# Patient Record
Sex: FEMALE | Race: ASIAN | NOT HISPANIC OR LATINO
[De-identification: names, ages, dates, MRNs, and addresses within clinical notes are randomized per-mention and may not be internally consistent; named-entity substitution may affect disease eponyms.]

---

## 2020-02-03 ENCOUNTER — APPOINTMENT (OUTPATIENT)
Dept: OBGYN | Facility: CLINIC | Age: 75
End: 2020-02-03

## 2020-05-31 ENCOUNTER — EMERGENCY (EMERGENCY)
Facility: HOSPITAL | Age: 75
LOS: 0 days | Discharge: HOME | End: 2020-05-31
Attending: STUDENT IN AN ORGANIZED HEALTH CARE EDUCATION/TRAINING PROGRAM
Payer: MEDICAID

## 2020-05-31 VITALS
RESPIRATION RATE: 16 BRPM | DIASTOLIC BLOOD PRESSURE: 65 MMHG | TEMPERATURE: 98 F | SYSTOLIC BLOOD PRESSURE: 143 MMHG | OXYGEN SATURATION: 97 % | HEART RATE: 83 BPM

## 2020-05-31 VITALS — WEIGHT: 125 LBS | HEIGHT: 64 IN

## 2020-05-31 DIAGNOSIS — R19.7 DIARRHEA, UNSPECIFIED: ICD-10-CM

## 2020-05-31 DIAGNOSIS — K92.1 MELENA: ICD-10-CM

## 2020-05-31 DIAGNOSIS — K62.5 HEMORRHAGE OF ANUS AND RECTUM: ICD-10-CM

## 2020-05-31 DIAGNOSIS — R94.31 ABNORMAL ELECTROCARDIOGRAM [ECG] [EKG]: ICD-10-CM

## 2020-05-31 LAB
ALBUMIN SERPL ELPH-MCNC: 3.5 G/DL — SIGNIFICANT CHANGE UP (ref 3.3–5)
ALP SERPL-CCNC: 83 U/L — SIGNIFICANT CHANGE UP (ref 40–120)
ALT FLD-CCNC: 25 U/L — SIGNIFICANT CHANGE UP (ref 12–78)
ANION GAP SERPL CALC-SCNC: 4 MMOL/L — LOW (ref 5–17)
APPEARANCE UR: CLEAR — SIGNIFICANT CHANGE UP
APTT BLD: 30.1 SEC — SIGNIFICANT CHANGE UP (ref 27.5–36.3)
AST SERPL-CCNC: 14 U/L — LOW (ref 15–37)
BASOPHILS # BLD AUTO: 0.04 K/UL — SIGNIFICANT CHANGE UP (ref 0–0.2)
BASOPHILS NFR BLD AUTO: 0.5 % — SIGNIFICANT CHANGE UP (ref 0–2)
BILIRUB SERPL-MCNC: 0.7 MG/DL — SIGNIFICANT CHANGE UP (ref 0.2–1.2)
BILIRUB UR-MCNC: NEGATIVE — SIGNIFICANT CHANGE UP
BUN SERPL-MCNC: 13 MG/DL — SIGNIFICANT CHANGE UP (ref 7–23)
CALCIUM SERPL-MCNC: 9.1 MG/DL — SIGNIFICANT CHANGE UP (ref 8.5–10.1)
CHLORIDE SERPL-SCNC: 103 MMOL/L — SIGNIFICANT CHANGE UP (ref 96–108)
CO2 SERPL-SCNC: 28 MMOL/L — SIGNIFICANT CHANGE UP (ref 22–31)
COLOR SPEC: YELLOW — SIGNIFICANT CHANGE UP
CREAT SERPL-MCNC: 1.04 MG/DL — SIGNIFICANT CHANGE UP (ref 0.5–1.3)
DIFF PNL FLD: ABNORMAL
EOSINOPHIL # BLD AUTO: 0.08 K/UL — SIGNIFICANT CHANGE UP (ref 0–0.5)
EOSINOPHIL NFR BLD AUTO: 0.9 % — SIGNIFICANT CHANGE UP (ref 0–6)
GLUCOSE SERPL-MCNC: 172 MG/DL — HIGH (ref 70–99)
GLUCOSE UR QL: NEGATIVE MG/DL — SIGNIFICANT CHANGE UP
HCT VFR BLD CALC: 42.6 % — SIGNIFICANT CHANGE UP (ref 34.5–45)
HGB BLD-MCNC: 14.2 G/DL — SIGNIFICANT CHANGE UP (ref 11.5–15.5)
IMM GRANULOCYTES NFR BLD AUTO: 0.1 % — SIGNIFICANT CHANGE UP (ref 0–1.5)
INR BLD: 1.04 RATIO — SIGNIFICANT CHANGE UP (ref 0.88–1.16)
KETONES UR-MCNC: NEGATIVE — SIGNIFICANT CHANGE UP
LEUKOCYTE ESTERASE UR-ACNC: ABNORMAL
LYMPHOCYTES # BLD AUTO: 1.76 K/UL — SIGNIFICANT CHANGE UP (ref 1–3.3)
LYMPHOCYTES # BLD AUTO: 20 % — SIGNIFICANT CHANGE UP (ref 13–44)
MCHC RBC-ENTMCNC: 31.3 PG — SIGNIFICANT CHANGE UP (ref 27–34)
MCHC RBC-ENTMCNC: 33.3 GM/DL — SIGNIFICANT CHANGE UP (ref 32–36)
MCV RBC AUTO: 93.8 FL — SIGNIFICANT CHANGE UP (ref 80–100)
MONOCYTES # BLD AUTO: 0.67 K/UL — SIGNIFICANT CHANGE UP (ref 0–0.9)
MONOCYTES NFR BLD AUTO: 7.6 % — SIGNIFICANT CHANGE UP (ref 2–14)
NEUTROPHILS # BLD AUTO: 6.23 K/UL — SIGNIFICANT CHANGE UP (ref 1.8–7.4)
NEUTROPHILS NFR BLD AUTO: 70.9 % — SIGNIFICANT CHANGE UP (ref 43–77)
NITRITE UR-MCNC: NEGATIVE — SIGNIFICANT CHANGE UP
PH UR: 7 — SIGNIFICANT CHANGE UP (ref 5–8)
PLATELET # BLD AUTO: 179 K/UL — SIGNIFICANT CHANGE UP (ref 150–400)
POTASSIUM SERPL-MCNC: 3.9 MMOL/L — SIGNIFICANT CHANGE UP (ref 3.5–5.3)
POTASSIUM SERPL-SCNC: 3.9 MMOL/L — SIGNIFICANT CHANGE UP (ref 3.5–5.3)
PROT SERPL-MCNC: 7.4 GM/DL — SIGNIFICANT CHANGE UP (ref 6–8.3)
PROT UR-MCNC: NEGATIVE MG/DL — SIGNIFICANT CHANGE UP
PROTHROM AB SERPL-ACNC: 11.6 SEC — SIGNIFICANT CHANGE UP (ref 10–12.9)
RBC # BLD: 4.54 M/UL — SIGNIFICANT CHANGE UP (ref 3.8–5.2)
RBC # FLD: 13 % — SIGNIFICANT CHANGE UP (ref 10.3–14.5)
SODIUM SERPL-SCNC: 135 MMOL/L — SIGNIFICANT CHANGE UP (ref 135–145)
SP GR SPEC: 1.01 — SIGNIFICANT CHANGE UP (ref 1.01–1.02)
TROPONIN I SERPL-MCNC: <0.015 NG/ML — SIGNIFICANT CHANGE UP (ref 0.01–0.04)
UROBILINOGEN FLD QL: NEGATIVE MG/DL — SIGNIFICANT CHANGE UP
WBC # BLD: 8.79 K/UL — SIGNIFICANT CHANGE UP (ref 3.8–10.5)
WBC # FLD AUTO: 8.79 K/UL — SIGNIFICANT CHANGE UP (ref 3.8–10.5)

## 2020-05-31 PROCEDURE — 86900 BLOOD TYPING SEROLOGIC ABO: CPT

## 2020-05-31 PROCEDURE — 74177 CT ABD & PELVIS W/CONTRAST: CPT

## 2020-05-31 PROCEDURE — 71045 X-RAY EXAM CHEST 1 VIEW: CPT | Mod: 26

## 2020-05-31 PROCEDURE — 71045 X-RAY EXAM CHEST 1 VIEW: CPT

## 2020-05-31 PROCEDURE — 93010 ELECTROCARDIOGRAM REPORT: CPT | Mod: 76

## 2020-05-31 PROCEDURE — 74177 CT ABD & PELVIS W/CONTRAST: CPT | Mod: 26

## 2020-05-31 PROCEDURE — 93005 ELECTROCARDIOGRAM TRACING: CPT

## 2020-05-31 PROCEDURE — 99285 EMERGENCY DEPT VISIT HI MDM: CPT

## 2020-05-31 PROCEDURE — 81001 URINALYSIS AUTO W/SCOPE: CPT

## 2020-05-31 PROCEDURE — 80053 COMPREHEN METABOLIC PANEL: CPT

## 2020-05-31 PROCEDURE — 85025 COMPLETE CBC W/AUTO DIFF WBC: CPT

## 2020-05-31 PROCEDURE — 86901 BLOOD TYPING SEROLOGIC RH(D): CPT

## 2020-05-31 PROCEDURE — 83690 ASSAY OF LIPASE: CPT

## 2020-05-31 PROCEDURE — 86850 RBC ANTIBODY SCREEN: CPT

## 2020-05-31 PROCEDURE — 85730 THROMBOPLASTIN TIME PARTIAL: CPT

## 2020-05-31 PROCEDURE — 84484 ASSAY OF TROPONIN QUANT: CPT

## 2020-05-31 PROCEDURE — 85610 PROTHROMBIN TIME: CPT

## 2020-05-31 PROCEDURE — 36415 COLL VENOUS BLD VENIPUNCTURE: CPT

## 2020-05-31 PROCEDURE — 99284 EMERGENCY DEPT VISIT MOD MDM: CPT | Mod: 25

## 2020-05-31 RX ORDER — SODIUM CHLORIDE 9 MG/ML
1000 INJECTION INTRAMUSCULAR; INTRAVENOUS; SUBCUTANEOUS ONCE
Refills: 0 | Status: COMPLETED | OUTPATIENT
Start: 2020-05-31 | End: 2020-05-31

## 2020-05-31 RX ADMIN — SODIUM CHLORIDE 1000 MILLILITER(S): 9 INJECTION INTRAMUSCULAR; INTRAVENOUS; SUBCUTANEOUS at 13:15

## 2020-05-31 NOTE — ED PROVIDER NOTE - PATIENT PORTAL LINK FT
You can access the FollowMyHealth Patient Portal offered by [facility] by registering at the following website: [email or web address]. By joining [facility]’s FollowMyHealth portal, you will also be able to view your health information using other applications (apps) compatible with our system.

## 2020-05-31 NOTE — ED ADULT TRIAGE NOTE - CHIEF COMPLAINT QUOTE
Patient presents with daughter who reports patient had abdominal pain since Friday. Reports patient had multiple episodes of bloody bowel movements yesterday and one this morning. Patient denies blood thinners, denies lightheaded dizziness, or shortness of breath. Patient reports nausea and that her heart feels like it is racing. Patient only speaks Mandarin, patient is also legally blind. Daughter phone # [phone]

## 2020-05-31 NOTE — ED ADULT NURSE NOTE - CHIEF COMPLAINT QUOTE
Patient presents with daughter who reports patient had abdominal pain since Friday. Reports patient had multiple episodes of bloody bowel movements yesterday and one this morning. Patient denies blood thinners, denies lightheaded dizziness, or shortness of breath. Patient reports nausea and that her heart feels like it is racing. Patient only speaks Mandarin, patient is also legally blind. Daughter phone # [phone]

## 2020-05-31 NOTE — ED PROVIDER NOTE - PROGRESS NOTE DETAILS
Joann Savage: Stool guaiac performed by  (insert doctors). Lot #: [id number], Expiration 08/31/2020; Result: (negative). Janette DO: Patient with abnormal EKG; no chest pain on multiple re-evals; 2 troponins and to f/u with cards/pmd as outpatient; gi f/u in 2 days as originally instructed; s/o to Dr. Alonso pending second troponin at this time. 2nd troponin.  Pt appears well, no acute complaints.  D/c home, f/u as scheduled. Dwain Alonso D.O. Joann Savage: Stool guaiac performed by Dr. Savage; Lot #: [id number], Expiration 08/31/2020; Result: negative.

## 2020-05-31 NOTE — ED ADULT NURSE NOTE - OBJECTIVE STATEMENT
pt a/ox3, BIB daughter through triage, c/o "diarrhea x several days, worsening. and bloody stools". pt daughter reports "she normally is constipated but for the past few days she has had diarrhea, yesterday she had several episodes of what looked like mucus and I noted blood in her stool also, she started Levaquin yesterday that she had at  home". pt denies fever, chills, sick contacts, sob, dizziness. pt reports "my chest just doesn't feel right".

## 2020-05-31 NOTE — ED ADULT NURSE NOTE - NSIMPLEMENTINTERV_GEN_ALL_ED
Implemented All Fall Risk Interventions:  [place] to call system. Call bell, personal items and telephone within reach. Instruct patient to call for assistance. Room bathroom lighting operational. Non-slip footwear when patient is off stretcher. Physically safe environment: no spills, clutter or unnecessary equipment. Stretcher in lowest position, wheels locked, appropriate side rails in place. Provide visual cue, wrist band, yellow gown, etc. Monitor gait and stability. Monitor for mental status changes and reorient to person, place, and time. Review medications for side effects contributing to fall risk. Reinforce activity limits and safety measures with patient and family.

## 2020-05-31 NOTE — ED STATDOCS - PROGRESS NOTE DETAILS
Joann FUNES for ED attending, Dr. Mota: 75 y/o F with no pertinent PMHx presenting to the ED c/o blood in stool x1 day. Per daughter, patient has had multiple BM with dark red blood mixed with stool. States that she has also been having CP that she describes as pressure. Denies any SOB, cough, fever or chills. Patient will be sent to main for further evaluation.

## 2020-05-31 NOTE — ED PROVIDER NOTE - OBJECTIVE STATEMENT
75 y/o female with a hx of constipation, presents to the ED BIB daughter for evaluation of bloody stools. Pt had multiple episodes of diarrhea the last few days. Last night had bright red blood per rectum. Had another episode of bright red blood per rectum this morning, so came to ED. Has an appointment with her GI Dr. Sepulveda on Wednesday, but pt did not want to wait. Denies chest pain, sob, lightheadedness, dizziness, abdominal pain.

## 2020-06-01 NOTE — ED POST DISCHARGE NOTE - DETAILS
Patient contacted, reviewed labs. Expressed concern over continued bleeding. Advised if concern is high to return to ED for reevalaution. Otherwise continue with GI follow up tomorrow as planned. - SHIRA Carlson

## 2020-11-23 PROBLEM — K59.00 CONSTIPATION, UNSPECIFIED: Chronic | Status: ACTIVE | Noted: 2020-05-31

## 2020-11-25 PROBLEM — Z00.00 ENCOUNTER FOR PREVENTIVE HEALTH EXAMINATION: Status: ACTIVE | Noted: 2020-11-25

## 2020-12-02 ENCOUNTER — APPOINTMENT (OUTPATIENT)
Dept: CARDIOLOGY | Facility: CLINIC | Age: 75
End: 2020-12-02
Payer: MEDICAID

## 2020-12-02 ENCOUNTER — NON-APPOINTMENT (OUTPATIENT)
Age: 75
End: 2020-12-02

## 2020-12-02 VITALS
DIASTOLIC BLOOD PRESSURE: 68 MMHG | WEIGHT: 125 LBS | RESPIRATION RATE: 16 BRPM | HEIGHT: 64 IN | HEART RATE: 63 BPM | TEMPERATURE: 97.3 F | BODY MASS INDEX: 21.34 KG/M2 | SYSTOLIC BLOOD PRESSURE: 124 MMHG

## 2020-12-02 DIAGNOSIS — M79.673 PAIN IN UNSPECIFIED FOOT: ICD-10-CM

## 2020-12-02 DIAGNOSIS — R09.89 OTHER SPECIFIED SYMPTOMS AND SIGNS INVOLVING THE CIRCULATORY AND RESPIRATORY SYSTEMS: ICD-10-CM

## 2020-12-02 DIAGNOSIS — Z78.9 OTHER SPECIFIED HEALTH STATUS: ICD-10-CM

## 2020-12-02 DIAGNOSIS — Z87.898 PERSONAL HISTORY OF OTHER SPECIFIED CONDITIONS: ICD-10-CM

## 2020-12-02 DIAGNOSIS — Z87.19 PERSONAL HISTORY OF OTHER DISEASES OF THE DIGESTIVE SYSTEM: ICD-10-CM

## 2020-12-02 DIAGNOSIS — Z86.39 PERSONAL HISTORY OF OTHER ENDOCRINE, NUTRITIONAL AND METABOLIC DISEASE: ICD-10-CM

## 2020-12-02 PROCEDURE — 93000 ELECTROCARDIOGRAM COMPLETE: CPT

## 2020-12-02 PROCEDURE — 99204 OFFICE O/P NEW MOD 45 MIN: CPT

## 2020-12-02 PROCEDURE — 99072 ADDL SUPL MATRL&STAF TM PHE: CPT

## 2020-12-02 RX ORDER — MULTIVITAMIN
TABLET ORAL
Refills: 0 | Status: ACTIVE | COMMUNITY

## 2020-12-02 RX ORDER — PSYLLIUM HUSK 0.4 G
CAPSULE ORAL
Refills: 0 | Status: ACTIVE | COMMUNITY

## 2020-12-02 RX ORDER — TIMOLOL MALEATE 5 MG/ML
SOLUTION/ DROPS OPHTHALMIC
Refills: 0 | Status: DISCONTINUED | COMMUNITY
End: 2020-12-02

## 2020-12-02 RX ORDER — UBIDECARENONE/VIT E ACET 100MG-5
CAPSULE ORAL
Refills: 0 | Status: ACTIVE | COMMUNITY

## 2020-12-05 ENCOUNTER — APPOINTMENT (OUTPATIENT)
Dept: CARDIOLOGY | Facility: CLINIC | Age: 75
End: 2020-12-05
Payer: MEDICAID

## 2020-12-05 PROCEDURE — 99072 ADDL SUPL MATRL&STAF TM PHE: CPT

## 2020-12-05 PROCEDURE — 93306 TTE W/DOPPLER COMPLETE: CPT

## 2020-12-05 PROCEDURE — 93880 EXTRACRANIAL BILAT STUDY: CPT

## 2021-01-15 ENCOUNTER — APPOINTMENT (OUTPATIENT)
Dept: CARDIOLOGY | Facility: CLINIC | Age: 76
End: 2021-01-15
Payer: MEDICAID

## 2021-01-15 PROCEDURE — 99072 ADDL SUPL MATRL&STAF TM PHE: CPT

## 2021-01-15 PROCEDURE — A9500: CPT

## 2021-01-15 PROCEDURE — 78452 HT MUSCLE IMAGE SPECT MULT: CPT

## 2021-01-15 PROCEDURE — 93015 CV STRESS TEST SUPVJ I&R: CPT

## 2021-01-15 RX ADMIN — REGADENOSON 0 MG/5ML: 0.08 INJECTION, SOLUTION INTRAVENOUS at 00:00

## 2021-01-15 RX ADMIN — AMINOPHYLLINE 0 MG/ML: 25 INJECTION, SOLUTION INTRAVENOUS at 00:00

## 2021-01-19 ENCOUNTER — APPOINTMENT (OUTPATIENT)
Dept: CARDIOLOGY | Facility: CLINIC | Age: 76
End: 2021-01-19
Payer: MEDICAID

## 2021-01-19 VITALS
RESPIRATION RATE: 16 BRPM | HEART RATE: 71 BPM | SYSTOLIC BLOOD PRESSURE: 120 MMHG | DIASTOLIC BLOOD PRESSURE: 68 MMHG | BODY MASS INDEX: 22.02 KG/M2 | HEIGHT: 64 IN | TEMPERATURE: 97.4 F | WEIGHT: 129 LBS

## 2021-01-19 DIAGNOSIS — R94.39 ABNORMAL RESULT OF OTHER CARDIOVASCULAR FUNCTION STUDY: ICD-10-CM

## 2021-01-19 DIAGNOSIS — I49.3 VENTRICULAR PREMATURE DEPOLARIZATION: ICD-10-CM

## 2021-01-19 DIAGNOSIS — R06.02 SHORTNESS OF BREATH: ICD-10-CM

## 2021-01-19 PROCEDURE — 99215 OFFICE O/P EST HI 40 MIN: CPT

## 2021-01-19 PROCEDURE — 99072 ADDL SUPL MATRL&STAF TM PHE: CPT

## 2021-01-19 PROCEDURE — 93000 ELECTROCARDIOGRAM COMPLETE: CPT

## 2021-01-19 NOTE — HISTORY OF PRESENT ILLNESS
[FreeTextEntry1] : Once again, she was scheduled to have a colonoscopy to evaluate her severe constipation last year but this was aborted due to findings of having an irregular EKG and also that she had some type of anxiety attack during preadmission.  \par \par Pharmacologic Nuclear Stress Test (1/15/2021):  Patient developed shortness of breath and fatigue during the stress test, resolved with IV aminophylline.  Patient developed occasional PAC's during exercise.  There was 1.0 mm downsloping ST segment depression in leads, II, III, and aVF and V4, V5, and V6 during the peak stress period (EKG positive for ischemia).  More importantly, there was abnormal myocardial perfusion imaging demonstrating small area of mild to moderate apical/apical anterior wall ischemia.  The LV systolic function was preserved with an EF of 68%.\par \par Transthoracic Echocardiogram (12/5/2020):  Normal global LV systolic function with an LVEF of 60 to 65%.  The left and right atria normal in size and structure.  Trace MR.  Mild AR. Trace CA.\par \par Carotid Doppler (12/5/2020):  There was minimal homogenous plaque on the left and NO evidence of plaque on the right.  There was antegrade flow of the vertebral arteries bilaterally.\par \par One Week Event Monitor (12/4 to 12/10/2020):  Normal sinus rhythm with average heart rate 67 bpm (Max 120, Min 50).  There were no findings of atrial fibrillation, SVT, VT, pauses of heart block.  There were some PAC's found with 5% burden and PVC's with less than 1% burden.\par \par

## 2021-01-19 NOTE — REASON FOR VISIT
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 Valle is here today for follow up and to review the results of most recent Nuclear Stress Test, Transthoracic Echocardiogram, Carotid Doppler and One Week Event Monitor.\par \par The patient is here today with her daughter whom is translating.  She continues to experience intermittent episodes of shortness of breath and palpitations, but reports the chest tightness and discomfort has improved although still present from time to time.  She has been tolerating recently prescribed Crestor 10 mg nightly.\par \par She denies associated symptoms such as diaphoresis, PND, orthopnea, presyncope, edema.\par \par

## 2021-01-19 NOTE — ASSESSMENT
[FreeTextEntry1] : EKG 1/19/2021:  The EKG illustrates sinus rhythm with PAC's in bigeminy, rate of 71 bpm, ST-T wave abnormality consider inferior ischemia, ST-T wave abnormality consider anterolateral ischemia.\par \par Blood work (September 2020):  Creatinine (1.08), BUN (24), ALT (10), AST (17), Total Cholesterol (292), HDL (55), LDL (206), Triglycerides (153), HgA1C (6.6%).

## 2021-01-19 NOTE — DISCUSSION/SUMMARY
[FreeTextEntry1] : 1).  Patient's Nuclear Stress test is positive for coronary ischemia and she remains symptomatic with chest discomfort, shortness of breath and palpitations.  EKG also remains abnormal with ST-T wave changes.\par \par Strongly encouraged an ischemic workup with Left Heart Catheterization performed in the near future to assess coronary perfusion; ?possible revascularization.\par \par Patient would prefer to defer at this time, will discuss with her family and her  about proceeding with angiogram and get back to us in a couple days.\par \par 2).  She is to continue current cardiac meds including Crestor 10 mg nightly.  She is to begin low dose ASA 81 mg daily as well.\par \par 3).  Complete updated blood work one week prior to follow up office visit to assess fasting lipid panel and CMP.\par \par 4).  Immediately go to the emergency department and/or report any untoward symptoms to our office including worsening chest discomfort, shortness of breath, palpitations, excessive sweating.\par \par 5).  Follow up with our office in 6 to 8 weeks or PRN.

## 2021-01-19 NOTE — PHYSICAL EXAM
[General Appearance - Well Developed] : well developed [Normal Appearance] : normal appearance [General Appearance - Well Nourished] : well nourished [General Appearance - In No Acute Distress] : no acute distress [Normal Conjunctiva] : the conjunctiva exhibited no abnormalities [Normal Oral Mucosa] : normal oral mucosa [Normal Jugular Venous A Waves Present] : normal jugular venous A waves present [Normal Jugular Venous V Waves Present] : normal jugular venous V waves present [No Jugular Venous Dumont A Waves] : no jugular venous dumont A waves [] : no respiratory distress [Respiration, Rhythm And Depth] : normal respiratory rhythm and effort [Auscultation Breath Sounds / Voice Sounds] : lungs were clear to auscultation bilaterally [Heart Sounds] : normal S1 and S2 [Murmurs] : no murmurs present [Edema] : no peripheral edema present [FreeTextEntry1] : Normal rate with slightly irregular rhythm  [Bowel Sounds] : normal bowel sounds [Abnormal Walk] : normal gait [Nail Clubbing] : no clubbing of the fingernails [Cyanosis, Localized] : no localized cyanosis [Skin Color & Pigmentation] : normal skin color and pigmentation [Skin Turgor] : normal skin turgor [Oriented To Time, Place, And Person] : oriented to person, place, and time [Impaired Insight] : insight and judgment were intact [Affect] : the affect was normal

## 2021-02-09 RX ORDER — REGADENOSON 0.08 MG/ML
0.4 INJECTION, SOLUTION INTRAVENOUS
Qty: 4 | Refills: 0 | Status: COMPLETED | OUTPATIENT
Start: 2021-01-15

## 2021-02-09 RX ORDER — AMINOPHYLLINE 25 MG/ML
25 INJECTION, SOLUTION INTRAVENOUS
Qty: 0 | Refills: 0 | Status: COMPLETED | OUTPATIENT
Start: 2021-01-15

## 2021-02-19 ENCOUNTER — APPOINTMENT (OUTPATIENT)
Dept: DISASTER EMERGENCY | Facility: CLINIC | Age: 76
End: 2021-02-19

## 2021-02-19 DIAGNOSIS — Z01.818 ENCOUNTER FOR OTHER PREPROCEDURAL EXAMINATION: ICD-10-CM

## 2021-02-19 NOTE — H&P PST ADULT - NSICDXPASTMEDICALHX_GEN_ALL_CORE_FT
PAST MEDICAL HISTORY:  Constipation      PAST MEDICAL HISTORY:  Constipation     Diabetes mellitus     H/O blindness left eye    History of glaucoma

## 2021-02-19 NOTE — H&P PST ADULT - NSICDXPASTSURGICALHX_GEN_ALL_CORE_FT
PAST SURGICAL HISTORY:  H/O eye surgery     Prolapsed uterus s/p surgery to repair    Status post hip replacement, right

## 2021-02-19 NOTE — H&P PST ADULT - HISTORY OF PRESENT ILLNESS
Narrative: This is a 75 F with a significant PMH of consitpation, DM2 and HLD who presents today for anticipated LHC with Dr. Olson.  She was supposed to have a colonoscopy performed for evaluations of severe constipation, but was found to have an irregular EKG.  Further investigation with NST found small area of mild to moderate apical/apical anterior wall ischemia with a LVEF of 68%.  She endorsesintermittent episodes of SOB and palpitations but states that the chest tightness and discomfort has improved, although still present from time to time.  TTE on 12/5/20 with LVEF 60 to 65%, LA and RA normal, trace MR and VT and mild AR.      ASA  Mallampati  BRA  GFR  scr  Symptoms:        Angina (Class):        Ischemic Symptoms:     Heart Failure:        Systolic/Diastolic/Combined:        NYHA Class (within 2 weeks):     Assessment of LVEF:       EF: 68       Assessed by: Stress        Date: 1/15/21    Prior Cardiac Interventions:       PCI's: None       CABG: None    Noninvasive Testing:   Stress Test: Date: 1/15/21       Protocol: Pharm       Duration of Exercise:        Symptoms: SOB and fatigue       EKG Changes: occasional PACs during exercise.  ST depression II, III aVF, V4, V5, V6       DTS:        Myocardial Imaging: Small area of mild-moderate apical/apical anterior wall ischemia       Risk Assessment:       Antianginal Therapies:        Beta Blockers:         Calcium Channel Blockers:        Long Acting Nitrates:        Ranexa:     Associated Risk Factors:        Cerebrovascular Disease: N/A       Chronic Lung Disease: N/A       Peripheral Arterial Disease: N/A       Chronic Kidney Disease (if yes, what is GFR): N/A       Uncontrolled Diabetes (if yes, what is HgbA1C or FBS): N/A       Poorly Controlled Hypertension (if yes, what is SBP): N/A       Morbid Obesity (if yes, what is BMI): N/A       History of Recent Ventricular Arrhythmia: N/A       Inability to Ambulate Safely: N/A       Need for Therapeutic Anticoagulation: N/A       Antiplatelet or Contrast Allergy: N/A Narrative: This is a 75 F with a significant PMH of consitpation, DM2 and HLD who presents today for anticipated LHC with Dr. Olson.  She was supposed to have a colonoscopy performed for evaluations of severe constipation, but was found to have an irregular EKG.  Further investigation with NST found small area of mild to moderate apical/apical anterior wall ischemia with a LVEF of 68%.  She endorses intermittent episodes of SOB and palpitations but states that the chest tightness and discomfort has improved, although still present from time to time.  TTE on 12/5/20 with LVEF 60 to 65%, LA and RA normal, trace MR and WA and mild AR.      ASA 2  Mallampati 2  BRA  GFR    Symptoms:        Angina (Class):        Ischemic Symptoms:     Heart Failure: NONE        Assessment of LVEF:       EF: 68       Assessed by: Stress        Date: 1/15/21    Prior Cardiac Interventions:       PCI's: None       CABG: None    Noninvasive Testing:   Stress Test: Date: 1/15/21       Protocol: Pharm       Duration of Exercise:        Symptoms: SOB and fatigue       EKG Changes: occasional PACs during exercise.  ST depression II, III aVF, V4, V5, V6       DTS:        Myocardial Imaging: Small area of mild-moderate apical/apical anterior wall ischemia       Risk Assessment:       Antianginal Therapies: NONE        Narrative: This is a 75 F with a significant PMH of consitpation, DM2 and HLD who presents today for anticipated LHC with Dr. Olson.  She was supposed to have a colonoscopy performed for evaluations of severe constipation, but was found to have an irregular EKG.  Further investigation with NST found small area of mild to moderate apical/apical anterior wall ischemia with a LVEF of 68%.  She endorses intermittent episodes of SOB and palpitations but states that the chest tightness and discomfort has improved, although still present from time to time.  TTE on 12/5/20 with LVEF 60 to 65%, LA and RA normal, trace MR and SD and mild AR.      ASA 2  Mallampati 2  BRA  GFR 72    Symptoms:        Angina (Class): 2-3       Ischemic Symptoms: chest tightness    Heart Failure: NONE        Assessment of LVEF:       EF: 68       Assessed by: Stress        Date: 1/15/21    Prior Cardiac Interventions:       PCI's: None       CABG: None    Noninvasive Testing:   Stress Test: Date: 1/15/21       Protocol: Pharm       Duration of Exercise:        Symptoms: SOB and fatigue       EKG Changes: occasional PACs during exercise.  ST depression II, III aVF, V4, V5, V6       DTS:        Myocardial Imaging: Small area of mild-moderate apical/apical anterior wall ischemia       Risk Assessment: Low      Antianginal Therapies: NONE        Narrative: This is a 75 F with a significant PMH of consitpation, DM2 and HLD who presents today for anticipated LHC with Dr. Olson.  She was supposed to have a colonoscopy performed for evaluations of severe constipation, but was found to have an irregular EKG.  Further investigation with NST found small area of mild to moderate apical/apical anterior wall ischemia with a LVEF of 68%.  She endorses intermittent episodes of SOB and palpitations but states that the chest tightness and discomfort has improved, although still present from time to time.  TTE on 12/5/20 with LVEF 60 to 65%, LA and RA normal, trace MR and IA and mild AR.      ASA 2  Mallampati 2  BRA 2.6%  GFR 72    Symptoms:        Angina (Class): 2-3       Ischemic Symptoms: chest tightness    Heart Failure: NONE        Assessment of LVEF:       EF: 68       Assessed by: Stress        Date: 1/15/21    Prior Cardiac Interventions:       PCI's: None       CABG: None    Noninvasive Testing:   Stress Test: Date: 1/15/21       Protocol: Pharm       Duration of Exercise:        Symptoms: SOB and fatigue       EKG Changes: occasional PACs during exercise.  ST depression II, III aVF, V4, V5, V6       DTS:        Myocardial Imaging: Small area of mild-moderate apical/apical anterior wall ischemia       Risk Assessment: Low      Antianginal Therapies: NONE

## 2021-02-19 NOTE — H&P PST ADULT - ASSESSMENT
75 F with a significant PMH of consitpation, DM2 and HLD who presents today for anticipated LHC with Dr. Olson.      - Consent to be obtained by physician  - 12 Lead EKG, Labs and imaging to be reviewed  - Procedure explained at length.  Risks v benefits reviewed.  All questions answered.

## 2021-02-20 LAB — SARS-COV-2 N GENE NPH QL NAA+PROBE: NOT DETECTED

## 2021-02-22 ENCOUNTER — TRANSCRIPTION ENCOUNTER (OUTPATIENT)
Age: 76
End: 2021-02-22

## 2021-02-22 ENCOUNTER — OUTPATIENT (OUTPATIENT)
Dept: OUTPATIENT SERVICES | Facility: HOSPITAL | Age: 76
LOS: 1 days | Discharge: HOME | End: 2021-02-22
Payer: MEDICAID

## 2021-02-22 VITALS
OXYGEN SATURATION: 98 % | WEIGHT: 130.07 LBS | TEMPERATURE: 98 F | RESPIRATION RATE: 18 BRPM | DIASTOLIC BLOOD PRESSURE: 77 MMHG | HEIGHT: 62 IN | SYSTOLIC BLOOD PRESSURE: 152 MMHG | HEART RATE: 65 BPM

## 2021-02-22 VITALS
OXYGEN SATURATION: 97 % | SYSTOLIC BLOOD PRESSURE: 142 MMHG | HEART RATE: 64 BPM | DIASTOLIC BLOOD PRESSURE: 70 MMHG | RESPIRATION RATE: 16 BRPM

## 2021-02-22 DIAGNOSIS — R07.9 CHEST PAIN, UNSPECIFIED: ICD-10-CM

## 2021-02-22 DIAGNOSIS — N81.4 UTEROVAGINAL PROLAPSE, UNSPECIFIED: Chronic | ICD-10-CM

## 2021-02-22 DIAGNOSIS — Z98.890 OTHER SPECIFIED POSTPROCEDURAL STATES: Chronic | ICD-10-CM

## 2021-02-22 DIAGNOSIS — Z96.641 PRESENCE OF RIGHT ARTIFICIAL HIP JOINT: Chronic | ICD-10-CM

## 2021-02-22 LAB
ANION GAP SERPL CALC-SCNC: 10 MMOL/L — SIGNIFICANT CHANGE UP (ref 5–17)
APTT BLD: 30.1 SEC — SIGNIFICANT CHANGE UP (ref 27.5–35.5)
BUN SERPL-MCNC: 16 MG/DL — SIGNIFICANT CHANGE UP (ref 8–20)
CALCIUM SERPL-MCNC: 9.5 MG/DL — SIGNIFICANT CHANGE UP (ref 8.6–10.2)
CHLORIDE SERPL-SCNC: 104 MMOL/L — SIGNIFICANT CHANGE UP (ref 98–107)
CO2 SERPL-SCNC: 27 MMOL/L — SIGNIFICANT CHANGE UP (ref 22–29)
CREAT SERPL-MCNC: 0.81 MG/DL — SIGNIFICANT CHANGE UP (ref 0.5–1.3)
GLUCOSE SERPL-MCNC: 145 MG/DL — HIGH (ref 70–99)
HCT VFR BLD CALC: 44.9 % — SIGNIFICANT CHANGE UP (ref 34.5–45)
HGB BLD-MCNC: 15 G/DL — SIGNIFICANT CHANGE UP (ref 11.5–15.5)
INR BLD: 1 RATIO — SIGNIFICANT CHANGE UP (ref 0.88–1.16)
MAGNESIUM SERPL-MCNC: 2.3 MG/DL — SIGNIFICANT CHANGE UP (ref 1.6–2.6)
MCHC RBC-ENTMCNC: 31.3 PG — SIGNIFICANT CHANGE UP (ref 27–34)
MCHC RBC-ENTMCNC: 33.4 GM/DL — SIGNIFICANT CHANGE UP (ref 32–36)
MCV RBC AUTO: 93.5 FL — SIGNIFICANT CHANGE UP (ref 80–100)
PHOSPHATE SERPL-MCNC: 3.7 MG/DL — SIGNIFICANT CHANGE UP (ref 2.4–4.7)
PLATELET # BLD AUTO: 209 K/UL — SIGNIFICANT CHANGE UP (ref 150–400)
POTASSIUM SERPL-MCNC: 4.4 MMOL/L — SIGNIFICANT CHANGE UP (ref 3.5–5.3)
POTASSIUM SERPL-SCNC: 4.4 MMOL/L — SIGNIFICANT CHANGE UP (ref 3.5–5.3)
PROTHROM AB SERPL-ACNC: 11.6 SEC — SIGNIFICANT CHANGE UP (ref 10.6–13.6)
RBC # BLD: 4.8 M/UL — SIGNIFICANT CHANGE UP (ref 3.8–5.2)
RBC # FLD: 13.4 % — SIGNIFICANT CHANGE UP (ref 10.3–14.5)
SODIUM SERPL-SCNC: 141 MMOL/L — SIGNIFICANT CHANGE UP (ref 135–145)
WBC # BLD: 6.37 K/UL — SIGNIFICANT CHANGE UP (ref 3.8–10.5)
WBC # FLD AUTO: 6.37 K/UL — SIGNIFICANT CHANGE UP (ref 3.8–10.5)

## 2021-02-22 PROCEDURE — 92978 ENDOLUMINL IVUS OCT C 1ST: CPT | Mod: 26,LC

## 2021-02-22 PROCEDURE — 99153 MOD SED SAME PHYS/QHP EA: CPT

## 2021-02-22 PROCEDURE — 85027 COMPLETE CBC AUTOMATED: CPT

## 2021-02-22 PROCEDURE — 93010 ELECTROCARDIOGRAM REPORT: CPT

## 2021-02-22 PROCEDURE — 80048 BASIC METABOLIC PNL TOTAL CA: CPT

## 2021-02-22 PROCEDURE — 92928 PRQ TCAT PLMT NTRAC ST 1 LES: CPT | Mod: LC

## 2021-02-22 PROCEDURE — 99152 MOD SED SAME PHYS/QHP 5/>YRS: CPT

## 2021-02-22 PROCEDURE — 92978 ENDOLUMINL IVUS OCT C 1ST: CPT | Mod: LC

## 2021-02-22 PROCEDURE — C1725: CPT

## 2021-02-22 PROCEDURE — C9600: CPT | Mod: LC

## 2021-02-22 PROCEDURE — 83735 ASSAY OF MAGNESIUM: CPT

## 2021-02-22 PROCEDURE — 92921: CPT | Mod: 59,LC

## 2021-02-22 PROCEDURE — C1753: CPT

## 2021-02-22 PROCEDURE — 85610 PROTHROMBIN TIME: CPT

## 2021-02-22 PROCEDURE — 36415 COLL VENOUS BLD VENIPUNCTURE: CPT

## 2021-02-22 PROCEDURE — 92921: CPT | Mod: LC

## 2021-02-22 PROCEDURE — C1894: CPT

## 2021-02-22 PROCEDURE — 85730 THROMBOPLASTIN TIME PARTIAL: CPT

## 2021-02-22 PROCEDURE — 93454 CORONARY ARTERY ANGIO S&I: CPT | Mod: XU

## 2021-02-22 PROCEDURE — 93005 ELECTROCARDIOGRAM TRACING: CPT

## 2021-02-22 PROCEDURE — 93454 CORONARY ARTERY ANGIO S&I: CPT | Mod: 26,XU

## 2021-02-22 PROCEDURE — C1887: CPT

## 2021-02-22 PROCEDURE — C1769: CPT

## 2021-02-22 PROCEDURE — 84100 ASSAY OF PHOSPHORUS: CPT

## 2021-02-22 PROCEDURE — C1874: CPT

## 2021-02-22 RX ORDER — TIMOLOL 0.5 %
1 DROPS OPHTHALMIC (EYE)
Qty: 0 | Refills: 0 | DISCHARGE

## 2021-02-22 RX ORDER — ASPIRIN/CALCIUM CARB/MAGNESIUM 324 MG
81 TABLET ORAL DAILY
Refills: 0 | Status: DISCONTINUED | OUTPATIENT
Start: 2021-02-22 | End: 2021-03-08

## 2021-02-22 RX ORDER — CLOPIDOGREL BISULFATE 75 MG/1
75 TABLET, FILM COATED ORAL DAILY
Refills: 0 | Status: DISCONTINUED | OUTPATIENT
Start: 2021-02-23 | End: 2021-03-08

## 2021-02-22 RX ORDER — CLOPIDOGREL BISULFATE 75 MG/1
1 TABLET, FILM COATED ORAL
Qty: 90 | Refills: 3
Start: 2021-02-22

## 2021-02-22 RX ORDER — CLOPIDOGREL BISULFATE 75 MG/1
600 TABLET, FILM COATED ORAL ONCE
Refills: 0 | Status: COMPLETED | OUTPATIENT
Start: 2021-02-22 | End: 2021-02-22

## 2021-02-22 RX ORDER — ASPIRIN/CALCIUM CARB/MAGNESIUM 324 MG
81 TABLET ORAL ONCE
Refills: 0 | Status: COMPLETED | OUTPATIENT
Start: 2021-02-22 | End: 2021-02-22

## 2021-02-22 RX ORDER — TAFLUPROST 0 MG/.3ML
1 SOLUTION/ DROPS OPHTHALMIC
Qty: 0 | Refills: 0 | DISCHARGE

## 2021-02-22 RX ORDER — ASPIRIN/CALCIUM CARB/MAGNESIUM 324 MG
1 TABLET ORAL
Qty: 0 | Refills: 0 | DISCHARGE
Start: 2021-02-22

## 2021-02-22 RX ORDER — ROSUVASTATIN CALCIUM 5 MG/1
1 TABLET ORAL
Qty: 0 | Refills: 0 | DISCHARGE

## 2021-02-22 RX ADMIN — Medication 81 MILLIGRAM(S): at 12:53

## 2021-02-22 RX ADMIN — Medication 81 MILLIGRAM(S): at 11:45

## 2021-02-22 RX ADMIN — CLOPIDOGREL BISULFATE 600 MILLIGRAM(S): 75 TABLET, FILM COATED ORAL at 17:23

## 2021-02-22 NOTE — PROGRESS NOTE ADULT - SUBJECTIVE AND OBJECTIVE BOX
Cardiology NP Post procedure note:   (Daughter at bedside and translation via video mandarin translater)    -s/p PARISH x 1 to 80% dLCX/70% OM3 bifurcation (NIYAH 2.5x15 mm) via RRA by Dr. Olson (prelim report; official report to follow)   -Omnipaque=126cc  -Brilinta 180mg load PO given intraprocedure    EKG post PCI: NSB 59 bpm without ST elevations/depressions  TELE: NSB 50s    MEDICATIONS  (STANDING):  aspirin  chewable 81 milliGRAM(s) Chew daily  aspirin  chewable 81 milliGRAM(s) Oral once  clopidogrel Tablet 600 milliGRAM(s) Oral once    Allergies:  No Known Allergies      PAST MEDICAL & SURGICAL HISTORY:  H/O blindness  left eye    History of glaucoma    Diabetes mellitus    Constipation    Status post hip replacement, right    Prolapsed uterus  s/p surgery to repair    H/O eye surgery        Vital Signs Last 24 Hrs  T(C): 36.8 (22 Feb 2021 10:39), Max: 36.8 (22 Feb 2021 10:39)  T(F): 98.2 (22 Feb 2021 10:39), Max: 98.2 (22 Feb 2021 10:39)  HR: 63 (22 Feb 2021 14:25) (58 - 68)  BP: 99/55 (22 Feb 2021 14:25) (99/55 - 152/77)  BP(mean): --  RR: 16 (22 Feb 2021 14:25) (16 - 18)  SpO2: 98% (22 Feb 2021 14:25) (98% - 98%)    Physical Exam:  Constitutional: NAD, AAOx3  Cardiovascular: +S1S2 RRR  Pulmonary: CTA b/l, unlabored  GI: soft NTND +BS  Extremities: no pedal edema, +distal pulses b/l  Neuro: non focal, JEREZ x4  Procedure site: Right radial site benign without hematoma/bleeding; RUE warm/mobile/acyanotic; + right radial site    LABS:                        15.0   6.37  )-----------( 209      ( 22 Feb 2021 11:11 )             44.9     02-22    141  |  104  |  16.0  ----------------------------<  145<H>  4.4   |  27.0  |  0.81    Ca    9.5      22 Feb 2021 10:20  Phos  3.7     02-22  Mg     2.3     02-22      PT/INR - ( 22 Feb 2021 10:20 )   PT: 11.6 sec;   INR: 1.00 ratio         PTT - ( 22 Feb 2021 10:20 )  PTT:30.1 sec      RADIOLOGY & ADDITIONAL TESTS:

## 2021-02-22 NOTE — DISCHARGE NOTE PROVIDER - NSDCMRMEDTOKEN_GEN_ALL_CORE_FT
aspirin 81 mg oral tablet, chewable: 1 tab(s) orally once a day  clopidogrel 75 mg oral tablet: 1 tab(s) orally once a day  rosuvastatin 10 mg oral tablet: 1 tab(s) orally once a day  tafluprost 0.0015% ophthalmic solution: 1 drop(s) to each affected eye once a day (in the evening)  timolol hemihydrate 0.5% ophthalmic solution: 1 drop(s) to each affected eye 2 times a day

## 2021-02-22 NOTE — DISCHARGE NOTE PROVIDER - NSDCFUSCHEDAPPT_GEN_ALL_CORE_FT
ALEJANDRO PIERCE ; 03/11/2021 ; [facility] [place]

## 2021-02-22 NOTE — PROGRESS NOTE ADULT - ASSESSMENT
A/P:  This is a 76 y/o mandarin speaking female with a significant PMH of consitpation, DM2 and HLD who presented today for anticipated LHC with Dr. Olson.  She was supposed to have a colonoscopy performed for evaluations of severe constipation, but was found to have an irregular EKG.  Further investigation with NST found small area of mild to moderate apical/apical anterior wall ischemia with a LVEF of 68%.  She endorses intermittent episodes of SOB and palpitations but states that the chest tightness and discomfort has improved, although still present from time to time.  Now s/p LHC/PCI: s/p PARISH x 1 to 80% dLCX/70% OM3 bifurcation (NIYAH 2.5x15 mm) via RRA by Dr. Olson (prelim report; official report to follow).    -Remove radial band x 2 hours post procedure  -Bedrest x 3 hours post procedure then OOB and ambulate as tolerated  -If remains stable, may discharge to home later today  -Follow up with Dr. Jordan in one to two weeks  -Brilinta load during procedure; plavix load later today then Plavix 75mg daily starting tomorrow  -Meds: Maintain Statin; add baby ASA  -Benefits of ASA/Plavix emphasized with patient/family verbal understanding  -Lifestyle mods/diet/activity/meds discussed with patient/family verbal understanding  -Discussed with Dr. Olson

## 2021-02-22 NOTE — DISCHARGE NOTE PROVIDER - HOSPITAL COURSE
This is a 74 y/o mandarin speaking female with a significant PMH of consitpation, DM2 and HLD who presented today for anticipated LHC with Dr. Olson.  She was supposed to have a colonoscopy performed for evaluations of severe constipation, but was found to have an irregular EKG.  Further investigation with NST found small area of mild to moderate apical/apical anterior wall ischemia with a LVEF of 68%.  She endorses intermittent episodes of SOB and palpitations but states that the chest tightness and discomfort has improved, although still present from time to time.  Now s/p LHC/PCI: s/p PARISH x 1 to 80% dLCX/70% OM3 bifurcation (NIYAH 2.5x15 mm) via RRA by Dr. Olson (prelim report; official report to follow).    -Remove radial band x 2 hours post procedure  -Bedrest x 3 hours post procedure then OOB and ambulate as tolerated  -If remains stable, may discharge to home later today  -Follow up with Dr. Jordan in one to two weeks  -Brilinta load during procedure; plavix load later today then Plavix 75mg daily starting tomorrow  -Meds: Maintain Statin; add baby ASA  -Benefits of ASA/Plavix emphasized with patient/family verbal understanding  -Lifestyle mods/diet/activity/meds discussed with patient/family verbal understanding  -Discussed with Dr. Olson

## 2021-02-22 NOTE — DISCHARGE NOTE PROVIDER - CARE PROVIDER_API CALL
Elías Jordan)  Cardiovascular Disease  [address] [place] [address]  Phone: [phone]  Fax: [phone]  Follow Up Time: 2 weeks

## 2021-02-22 NOTE — DISCHARGE NOTE PROVIDER - NSDCCPCAREPLAN_GEN_ALL_CORE_FT
PRINCIPAL DISCHARGE DIAGNOSIS  Diagnosis: CAD (coronary artery disease)  Assessment and Plan of Treatment: PCI

## 2021-02-22 NOTE — DISCHARGE NOTE PROVIDER - NSDCFUADDAPPT_GEN_ALL_CORE_FT
Follow up with Dr. Jordan in one to two weeks    Restricted use with no heavy lifting of affected arm for 48 hours.  No submerging the arm in water for 48 hours.  You may start showering today.  Call your doctor for any bleeding, swelling, loss of sensation in the hand or fingers, or fingers turning blue.  If heavy bleeding or large lumps form, hold pressure at the spot and come to the Emergency Room.

## 2021-02-22 NOTE — DISCHARGE NOTE PROVIDER - NSDCCPTREATMENT_GEN_ALL_CORE_FT
PRINCIPAL PROCEDURE  Procedure: Coronary stent placement  Findings and Treatment: Add baby ASA and Plavix daily; maintain statin

## 2021-02-22 NOTE — DISCHARGE NOTE NURSING/CASE MANAGEMENT/SOCIAL WORK - PATIENT PORTAL LINK FT
You can access the FollowMyHealth Patient Portal offered by [facility] by registering at the following website: [email or web address]. By joining [facility]’s FollowMyHealth portal, you will also be able to view your health information using other applications (apps) compatible with our system.

## 2021-02-24 DIAGNOSIS — R94.39 ABNORMAL RESULT OF OTHER CARDIOVASCULAR FUNCTION STUDY: ICD-10-CM

## 2021-02-26 ENCOUNTER — NON-APPOINTMENT (OUTPATIENT)
Age: 76
End: 2021-02-26

## 2021-03-02 RX ORDER — KIT FOR THE PREPARATION OF TECHNETIUM TC99M SESTAMIBI 1 MG/5ML
INJECTION, POWDER, LYOPHILIZED, FOR SOLUTION PARENTERAL
Refills: 0 | Status: COMPLETED | OUTPATIENT
Start: 2021-03-02

## 2021-03-02 RX ADMIN — KIT FOR THE PREPARATION OF TECHNETIUM TC99M SESTAMIBI 0: 1 INJECTION, POWDER, LYOPHILIZED, FOR SOLUTION PARENTERAL at 00:00

## 2021-03-10 ENCOUNTER — APPOINTMENT (OUTPATIENT)
Dept: CARDIOLOGY | Facility: CLINIC | Age: 76
End: 2021-03-10
Payer: MEDICAID

## 2021-03-10 ENCOUNTER — NON-APPOINTMENT (OUTPATIENT)
Age: 76
End: 2021-03-10

## 2021-03-10 VITALS
HEART RATE: 62 BPM | RESPIRATION RATE: 16 BRPM | WEIGHT: 126 LBS | HEIGHT: 64 IN | BODY MASS INDEX: 21.51 KG/M2 | SYSTOLIC BLOOD PRESSURE: 112 MMHG | DIASTOLIC BLOOD PRESSURE: 68 MMHG | TEMPERATURE: 97.5 F

## 2021-03-10 PROBLEM — E11.9 TYPE 2 DIABETES MELLITUS WITHOUT COMPLICATIONS: Chronic | Status: ACTIVE | Noted: 2021-02-22

## 2021-03-10 PROBLEM — Z86.69 PERSONAL HISTORY OF OTHER DISEASES OF THE NERVOUS SYSTEM AND SENSE ORGANS: Chronic | Status: ACTIVE | Noted: 2021-02-22

## 2021-03-10 PROCEDURE — 99214 OFFICE O/P EST MOD 30 MIN: CPT

## 2021-03-10 PROCEDURE — 99072 ADDL SUPL MATRL&STAF TM PHE: CPT

## 2021-03-10 PROCEDURE — 93000 ELECTROCARDIOGRAM COMPLETE: CPT

## 2021-03-10 NOTE — REASON FOR VISIT
[Follow-Up - Clinic] : a clinic follow-up of [FreeTextEntry1] : The patient is a 75-year-old  woman with a history for recent coronary stent to the circumflex vessel (2/22/21 ) after complaining of chest pain and shortness of breath with abnormal nuclear stress test;\par \par She returns to the office today accompanied with her daughter to interpret;\par \par Overall, the patient states that she's been feeling fairly well with a few other somatic complaints;\par \par She gets off occasional feeling of palpitation in the chest briefly and occasional lightheadedness when leaning back or bending forward;\par \par There has been no significant chest pain, shortness of breath or syncope;\par \par

## 2021-03-10 NOTE — PHYSICAL EXAM
[FreeTextEntry1] : Alert, pleasant, 75-year-old  woman in no acute distress; [Normal Conjunctiva] : the conjunctiva exhibited no abnormalities [Eyelids - No Xanthelasma] : the eyelids demonstrated no xanthelasmas [Normal Oral Mucosa] : normal oral mucosa [No Oral Pallor] : no oral pallor [No Oral Cyanosis] : no oral cyanosis [Normal Jugular Venous A Waves Present] : normal jugular venous A waves present [Normal Jugular Venous V Waves Present] : normal jugular venous V waves present [No Jugular Venous Dumont A Waves] : no jugular venous dumont A waves [Respiration, Rhythm And Depth] : normal respiratory rhythm and effort [Exaggerated Use Of Accessory Muscles For Inspiration] : no accessory muscle use [Auscultation Breath Sounds / Voice Sounds] : lungs were clear to auscultation bilaterally [Heart Rate And Rhythm] : heart rate and rhythm were normal [Heart Sounds] : normal S1 and S2 [Murmurs] : no murmurs present [Abdomen Soft] : soft [Abdomen Tenderness] : non-tender [Abdomen Mass (___ Cm)] : no abdominal mass palpated [Abnormal Walk] : normal gait [Gait - Sufficient For Exercise Testing] : the gait was sufficient for exercise testing [Nail Clubbing] : no clubbing of the fingernails [Cyanosis, Localized] : no localized cyanosis [Petechial Hemorrhages (___cm)] : no petechial hemorrhages [Skin Color & Pigmentation] : normal skin color and pigmentation [] : no rash [No Venous Stasis] : no venous stasis [Skin Lesions] : no skin lesions [No Skin Ulcers] : no skin ulcer [No Xanthoma] : no  xanthoma was observed [Oriented To Time, Place, And Person] : oriented to person, place, and time [Affect] : the affect was normal [Mood] : the mood was normal

## 2021-03-10 NOTE — HISTORY OF PRESENT ILLNESS
[FreeTextEntry1] : She is asking about the need to continue her current medical regimen;\par \par She is otherwise tolerating her current medical regimen without difficulty including Plavix, aspirin and Crestor;

## 2021-03-10 NOTE — ASSESSMENT
[FreeTextEntry1] : EKG demonstrating normal sinus rhythm at a rate 62 with nonspecific ST T wave changes-diffuse; unchanged from previous\par \par In summary the patient is a 75-year-old woman with recent PARISH to circumflex OM 3 branch who has been recuperating fairly well;\par Occasional fleeting palpitations could be secondary to anxiety and occasional PACs seen on EKG;\par \par \par \par Plan:\par \par Patient encouraged to continue current medical regimen;\par \par Discussed importance of continuing dual antiplatelet drugs;\par \par Recommend maintaining good p.o. hydration;\par \par Begin increasing walking exercise activity gradually;\par \par Between 4-6 weeks post stent, patient should be able to start increasing her exercise tolerance;\par \par Return to office within 2-3 months or p.r.n.\par \par Discuss possibility of future nuclear stress test in the summer or early fall;;;;\par \par note: blistering rash in right wrist area postcardiac cath-- likely secondary to adhesive/tape allergy;

## 2021-05-11 ENCOUNTER — NON-APPOINTMENT (OUTPATIENT)
Age: 76
End: 2021-05-11

## 2021-05-13 ENCOUNTER — APPOINTMENT (OUTPATIENT)
Dept: CARDIOLOGY | Facility: CLINIC | Age: 76
End: 2021-05-13

## 2021-05-19 ENCOUNTER — APPOINTMENT (OUTPATIENT)
Dept: CARDIOLOGY | Facility: CLINIC | Age: 76
End: 2021-05-19
Payer: MEDICAID

## 2021-05-19 ENCOUNTER — NON-APPOINTMENT (OUTPATIENT)
Age: 76
End: 2021-05-19

## 2021-05-19 VITALS
HEIGHT: 64 IN | SYSTOLIC BLOOD PRESSURE: 110 MMHG | WEIGHT: 123 LBS | RESPIRATION RATE: 14 BRPM | DIASTOLIC BLOOD PRESSURE: 64 MMHG | TEMPERATURE: 97.3 F | BODY MASS INDEX: 21 KG/M2 | HEART RATE: 64 BPM

## 2021-05-19 LAB
ALBUMIN SERPL ELPH-MCNC: 4.2 G/DL
ALP BLD-CCNC: 66 U/L
ALT SERPL-CCNC: 21 U/L
ANION GAP SERPL CALC-SCNC: 12 MMOL/L
AST SERPL-CCNC: 23 U/L
BILIRUB SERPL-MCNC: 0.5 MG/DL
BUN SERPL-MCNC: 14 MG/DL
CALCIUM SERPL-MCNC: 9.9 MG/DL
CHLORIDE SERPL-SCNC: 105 MMOL/L
CHOLEST SERPL-MCNC: 203 MG/DL
CO2 SERPL-SCNC: 26 MMOL/L
CREAT SERPL-MCNC: 0.89 MG/DL
GLUCOSE SERPL-MCNC: 110 MG/DL
HDLC SERPL-MCNC: 60 MG/DL
LDLC SERPL CALC-MCNC: 124 MG/DL
NONHDLC SERPL-MCNC: 143 MG/DL
POTASSIUM SERPL-SCNC: 4.6 MMOL/L
PROT SERPL-MCNC: 6.8 G/DL
SODIUM SERPL-SCNC: 143 MMOL/L
TRIGL SERPL-MCNC: 96 MG/DL

## 2021-05-19 PROCEDURE — 99214 OFFICE O/P EST MOD 30 MIN: CPT

## 2021-05-19 PROCEDURE — 93000 ELECTROCARDIOGRAM COMPLETE: CPT

## 2021-05-19 RX ORDER — ASPIRIN 81 MG/1
81 TABLET, CHEWABLE ORAL
Refills: 0 | Status: ACTIVE | COMMUNITY

## 2021-05-19 NOTE — ASSESSMENT
[FreeTextEntry1] : EKG 5/19/2021:  The EKG illustrates sinus rhythm, rate of 64 bpm, nonspecific ST depression and negative T waves.  Essentially unchanged.\par \par

## 2021-05-19 NOTE — DISCUSSION/SUMMARY
[FreeTextEntry1] : 1).  Patient's current chest symptoms and fatigue are chronic lasting almost all day for about 3 months now which is rather atypical for being cardiac in nature.  However, will advise a Nuclear Stress test in the near future to further assess coronary perfusion and for any signs of ischemia.\par \par Patient's daughter states her mom will be going to [place] in few days and she will follow up with a Cardiologist in that area about having nuclear stress test performed.\par \par Advised against having her mom travelling and waiting so long to have testing done, but this is what the family wishes to do AMA.\par \par 2).  Continue current cardiac meds as directed including DAPT and Crestor.\par \par 3).  Diet and lifestyle modification discussed including low sodium, low fat and low carbohydrate diet.  Patient is to implement modest aerobic exercise regimen few days per week pending results of stress test. \par \par 4).  Follow up with PCP (Dr. Milton) regarding routine checkups and blood work.  Forward all testing/lab work to our office. \par \par 5).  Immediately go to the emergency department and/or report any untoward symptoms to our office. \par \par 6).  Follow up with Dr. Schaefer in 3 to 4 months or PRN.

## 2021-05-19 NOTE — REASON FOR VISIT
[FreeTextEntry1] : ALEJANDRO PIERCE is being seen for a clinic follow-up of. \par \par The patient is a 75-year-old  woman with a history for recent coronary stent to the circumflex vessel (2/22/21 ) after complaining of chest pain and shortness of breath with abnormal nuclear stress test;\par \par She returns to the office today accompanied with her daughter to interpret;\par \par Overall, the patient states that she's been feeling fairly well with a few other somatic complaints;\par \par She has been experiencing a "pulling sensation" from her epigastrum to her back, sometimes feels like a "pressure" ever since having the stent placed back in February.  She has also been feeling more fatigued since that time as well.\par \par Patient reports these symptoms are almost constant, with no signs of worsening on exertion or any other associated symptoms to report.\par \par She denies diaphoresis, SOB, KAPOOR, palpitations, presyncope, syncope, edema.

## 2021-05-19 NOTE — HISTORY OF PRESENT ILLNESS
[FreeTextEntry1] : She was recently advised to increase Crestor 10 mg nightly to taking Crestor 20 mg QHS due to uncontrolled LDL levels (124) on most recent blood work.  Goal of LDL <70 with current history;\par \par Otherwise, she has been tolerating current medical regimen without difficulty including Plavix, ASA and Crestor; \par \par Cardiac catheterization (2/22/2021):  Distal circumflex with 80% stenosis, OM3 branch with 70% stenosis.  POBA performed to distal circumflex and stent to OM3.  Normal function by TTE with LVEF of 55%;\par \par \par \par

## 2021-08-24 NOTE — ED ADULT NURSE NOTE - NS PRO PASSIVE SMOKE EXP
Hpi Title: Evaluation of Skin Lesions
How Severe Are Your Spot(S)?: mild
Have Your Spot(S) Been Treated In The Past?: has not been treated
Family Member: Grandfather
No

## 2021-09-23 ENCOUNTER — APPOINTMENT (OUTPATIENT)
Dept: CARDIOLOGY | Facility: CLINIC | Age: 76
End: 2021-09-23

## 2022-10-04 ENCOUNTER — APPOINTMENT (OUTPATIENT)
Dept: CARDIOLOGY | Facility: CLINIC | Age: 77
End: 2022-10-04

## 2022-10-04 VITALS
HEIGHT: 64 IN | DIASTOLIC BLOOD PRESSURE: 70 MMHG | WEIGHT: 112 LBS | SYSTOLIC BLOOD PRESSURE: 112 MMHG | RESPIRATION RATE: 16 BRPM | HEART RATE: 58 BPM | BODY MASS INDEX: 19.12 KG/M2

## 2022-10-04 PROCEDURE — 99214 OFFICE O/P EST MOD 30 MIN: CPT | Mod: 25

## 2022-10-04 PROCEDURE — 93000 ELECTROCARDIOGRAM COMPLETE: CPT

## 2022-10-04 RX ORDER — TIMOLOL MALEATE 5 MG/ML
0.5 SOLUTION OPHTHALMIC
Refills: 0 | Status: DISCONTINUED | COMMUNITY
End: 2022-10-04

## 2022-10-04 RX ORDER — ROSUVASTATIN CALCIUM 20 MG/1
20 TABLET, FILM COATED ORAL
Qty: 90 | Refills: 3 | Status: DISCONTINUED | COMMUNITY
Start: 2020-12-02 | End: 2022-10-04

## 2022-10-04 RX ORDER — CLOPIDOGREL BISULFATE 75 MG/1
75 TABLET, FILM COATED ORAL
Qty: 90 | Refills: 3 | Status: DISCONTINUED | COMMUNITY
End: 2022-10-04

## 2022-10-04 NOTE — ASSESSMENT
[FreeTextEntry1] : EKG performed at another office 9/27/2022-- the EKG illustrates sinus rhythm, rate of 58 bpm, nonspecific ST -T wave flattening;; diffuse;\par \par

## 2022-10-04 NOTE — REVIEW OF SYSTEMS
[SOB] : shortness of breath [Chest Discomfort] : chest discomfort [Palpitations] : palpitations [Joint Pain] : joint pain [Mid Back Pain] : mid back pain [Negative] : Heme/Lymph

## 2022-10-04 NOTE — DISCUSSION/SUMMARY
[FreeTextEntry1] : Plan:\par \par Have recommended patient empirically arranged transthoracic echocardiogram and nuclear stress test to assess cardiac function and patency of coronary stent etc.;\par \par Based on the results of these tests will determine whether the patient could go back to some limited form of exercise to improve her functional capacity;\par \par Further evaluation of chronic mid spinal discomfort may be necessary as well;\par \par Regular checkups and laboratory blood test with primary care encouraged;\par \par Return to office to discuss results in approximately 4 to 6 weeks\par

## 2022-10-04 NOTE — REASON FOR VISIT
[FreeTextEntry3] : ES Mireles [FreeTextEntry1] : ALEJANDRO PIERCE is being seen for a clinic follow-up of. \par \par The patient is a 76-year-old  woman with a history for recent coronary stent to the circumflex vessel (2/22/21 ) after complaining of chest pain and shortness of breath with abnormal nuclear stress test; patient did well however she was lost to follow-up over the past year and a half as she was living in [place] and now returns back to the office for a checkup;\par \par She returns to the office today accompanied with her daughter to interpret;\par "pressure" ever since having the stent placed back in February.  She has also been feeling more fatigued since that time as well.\par \par She states she gets some atypical chest discomfort and tapping like feeling above the left breast and occasionally feels short of breath but also has some associated anxiety;\par She denies significant dizziness or syncope;

## 2022-10-04 NOTE — HISTORY OF PRESENT ILLNESS
[FreeTextEntry1] : She has been rather sedentary and readily admits since having failing eyesight she is more limited as to what she is capable of doing and having more fears ;\par \par she was recently advised to increase Crestor 10 mg nightly to taking Crestor 20 mg QHS due to uncontrolled LDL levels (124) on most recent blood work.  Goal of LDL <70 with current history;\par \par Otherwise, she has been tolerating current medical regimen without difficulty including Plavix, ASA and Crestor; \par \par Cardiac catheterization (2/22/2021):  Distal circumflex with 80% stenosis, OM3 branch with 70% stenosis.  POBA performed to distal circumflex and stent to OM3.  Normal function by TTE with LVEF of 55%;\par \par \par \par

## 2022-10-26 ENCOUNTER — APPOINTMENT (OUTPATIENT)
Dept: CARDIOLOGY | Facility: CLINIC | Age: 77
End: 2022-10-26

## 2022-10-26 PROCEDURE — 93306 TTE W/DOPPLER COMPLETE: CPT

## 2022-10-27 ENCOUNTER — APPOINTMENT (OUTPATIENT)
Dept: BREAST CENTER | Facility: CLINIC | Age: 77
End: 2022-10-27

## 2022-10-27 VITALS
HEIGHT: 64 IN | WEIGHT: 112 LBS | DIASTOLIC BLOOD PRESSURE: 67 MMHG | HEART RATE: 71 BPM | BODY MASS INDEX: 19.12 KG/M2 | SYSTOLIC BLOOD PRESSURE: 125 MMHG

## 2022-10-27 DIAGNOSIS — Z86.69 PERSONAL HISTORY OF OTHER DISEASES OF THE NERVOUS SYSTEM AND SENSE ORGANS: ICD-10-CM

## 2022-10-27 DIAGNOSIS — Z87.19 PERSONAL HISTORY OF OTHER DISEASES OF THE DIGESTIVE SYSTEM: ICD-10-CM

## 2022-10-27 DIAGNOSIS — Z80.8 FAMILY HISTORY OF MALIGNANT NEOPLASM OF OTHER ORGANS OR SYSTEMS: ICD-10-CM

## 2022-10-27 DIAGNOSIS — R92.2 INCONCLUSIVE MAMMOGRAM: ICD-10-CM

## 2022-10-27 DIAGNOSIS — R92.1 MAMMOGRAPHIC CALCIFICATION FOUND ON DIAGNOSTIC IMAGING OF BREAST: ICD-10-CM

## 2022-10-27 DIAGNOSIS — Z80.1 FAMILY HISTORY OF MALIGNANT NEOPLASM OF TRACHEA, BRONCHUS AND LUNG: ICD-10-CM

## 2022-10-27 DIAGNOSIS — Z86.79 PERSONAL HISTORY OF OTHER DISEASES OF THE CIRCULATORY SYSTEM: ICD-10-CM

## 2022-10-27 PROCEDURE — 99204 OFFICE O/P NEW MOD 45 MIN: CPT

## 2022-10-27 NOTE — HISTORY OF PRESENT ILLNESS
[FreeTextEntry1] : Ms. Valle is a 76 year old woman who presents for a consultation for left breast calcifications. She is asymptomatic. No palpable breast or axillary lumps, nipple discharge, or skin changes. \par \par Her family history is not significant for any breast cancer. Her daughter Mily Underwood is a patient of Dr. Mckenna.

## 2022-10-27 NOTE — DATA REVIEWED
[FreeTextEntry1] : B/l mammogram 3/25/22\par - calcifications in L breast\par - asymmetry in R breast\par - BIRADS 0\par \par B/l mammogram 4/13/22\par - prior R asymmetry is superimposition of breast tissue\par - calcifications in L breast 4-5:00; stereotactic bx\par - BIRADS 4B

## 2022-10-27 NOTE — PAST MEDICAL HISTORY
[Total Preg ___] : G[unfilled] [Live Births ___] : P[unfilled]  [Menarche Age ____] : age at menarche was [unfilled] [Menopause Age____] : age at menopause was [unfilled] [History of Hormone Replacement Treatment] : has no history of hormone replacement treatment [Age At Live Birth ___] : Age at live birth: [unfilled] [FreeTextEntry7] : No [FreeTextEntry8] : 8 mo

## 2022-10-27 NOTE — CONSULT LETTER
[Dear  ___] : Dear  [unfilled], [Consult Letter:] : I had the pleasure of evaluating your patient, [unfilled]. [Please see my note below.] : Please see my note below. [Consult Closing:] : Thank you very much for allowing me to participate in the care of this patient.  If you have any questions, please do not hesitate to contact me. [Sincerely,] : Sincerely, [FreeTextEntry3] : Shelia Keane MD FACS

## 2022-11-14 DIAGNOSIS — R92.8 OTHER ABNORMAL AND INCONCLUSIVE FINDINGS ON DIAGNOSTIC IMAGING OF BREAST: ICD-10-CM

## 2022-11-14 DIAGNOSIS — Z12.39 ENCOUNTER FOR OTHER SCREENING FOR MALIGNANT NEOPLASM OF BREAST: ICD-10-CM

## 2022-11-30 ENCOUNTER — APPOINTMENT (OUTPATIENT)
Dept: CARDIOLOGY | Facility: CLINIC | Age: 77
End: 2022-11-30

## 2022-11-30 ENCOUNTER — MED ADMIN CHARGE (OUTPATIENT)
Age: 77
End: 2022-11-30

## 2022-11-30 PROCEDURE — A9500: CPT

## 2022-11-30 PROCEDURE — 93015 CV STRESS TEST SUPVJ I&R: CPT

## 2022-11-30 PROCEDURE — 78452 HT MUSCLE IMAGE SPECT MULT: CPT

## 2022-11-30 RX ORDER — AMINOPHYLLINE 25 MG/ML
25 INJECTION, SOLUTION INTRAVENOUS
Qty: 0 | Refills: 0 | Status: COMPLETED | OUTPATIENT
Start: 2022-11-30

## 2022-11-30 RX ORDER — REGADENOSON 0.08 MG/ML
0.4 INJECTION, SOLUTION INTRAVENOUS
Qty: 4 | Refills: 0 | Status: COMPLETED | OUTPATIENT
Start: 2022-11-30

## 2022-11-30 RX ADMIN — AMINOPHYLLINE 3 MG/ML: 25 INJECTION, SOLUTION INTRAVENOUS at 00:00

## 2022-11-30 RX ADMIN — REGADENOSON 5 MG/5ML: 0.08 INJECTION, SOLUTION INTRAVENOUS at 00:00

## 2023-01-03 ENCOUNTER — APPOINTMENT (OUTPATIENT)
Dept: CARDIOLOGY | Facility: CLINIC | Age: 78
End: 2023-01-03
Payer: MEDICAID

## 2023-01-03 ENCOUNTER — NON-APPOINTMENT (OUTPATIENT)
Age: 78
End: 2023-01-03

## 2023-01-03 VITALS
DIASTOLIC BLOOD PRESSURE: 62 MMHG | SYSTOLIC BLOOD PRESSURE: 108 MMHG | HEIGHT: 64 IN | BODY MASS INDEX: 19.46 KG/M2 | WEIGHT: 114 LBS | RESPIRATION RATE: 16 BRPM | HEART RATE: 67 BPM

## 2023-01-03 DIAGNOSIS — F41.9 ANXIETY DISORDER, UNSPECIFIED: ICD-10-CM

## 2023-01-03 DIAGNOSIS — I38 ENDOCARDITIS, VALVE UNSPECIFIED: ICD-10-CM

## 2023-01-03 PROCEDURE — 93000 ELECTROCARDIOGRAM COMPLETE: CPT

## 2023-01-03 PROCEDURE — 99214 OFFICE O/P EST MOD 30 MIN: CPT | Mod: 25

## 2023-01-03 NOTE — HISTORY OF PRESENT ILLNESS
[FreeTextEntry1] : She was recently advised to increase Crestor 10 mg nightly to taking Atorvastatin 20 mg QHS due to uncontrolled LDL levels (124) on most recent blood work. Goal of LDL <70 with current history;\par \par Otherwise, she has been tolerating current medical regimen without difficulty including Plavix, ASA and Crestor; \par \par Cardiac catheterization (2/22/2021): Distal circumflex with 80% stenosis, OM3 branch with 70% stenosis. POBA performed to distal circumflex and stent to OM3. Normal function by TTE with LVEF of 55%;\par \par She underwent nuclear stress testing and transthoracic echocardiogram and is back today to review those results; \par \par Pharmacologic Nuclear Stress Test (11/30/2022):  The patient developed chest tightness with Regadenoson which resolved with Aminophylline.  Normal Sestamibi myocardial perfusion imaging with artifact.  LV function was hyperdynamic with LVEF of 73%.  No longer evidence of ischemia when compared to prior study dated (2021);\par \par Transthoracic Echocardiogram (10/26/2022):  Normal LV systolic and diastolic function with LVEF of 65 to 70%. The left and right atria normal in size and structure.  Mild AR. Trace MR. Mild MA and TR. Mildly elevated pulmonary artery systolic pressure with no evidence of pericardial effusion;

## 2023-01-03 NOTE — REASON FOR VISIT
[FreeTextEntry1] : ALEJANDRO PIERCE is being seen for a clinic follow-up of. \par \par The patient is a 77-year-old  woman with a history for recent coronary stent to the circumflex vessel (2/22/21 ) after complaining of chest pain and shortness of breath with abnormal nuclear stress test; patient did well however she was lost to follow-up over the past year and a half as she was living in [place] and now returns back to the office for a checkup;\par \par She returns to the office today accompanied with her daughter to interpret;\par "pressure" ever since having the stent placed back in February. She has also been feeling more fatigued since that time as well.\par \par She states she gets some atypical chest discomfort and tapping like feeling above the left breast and occasionally feels short of breath but also has some associated anxiety;\par She denies significant dizziness or syncope;

## 2023-01-03 NOTE — ASSESSMENT
[FreeTextEntry1] : EKG (1/3/2023): The EKG illustrates sinus rhythm, rate of 67 bpm, nonspecific ST -T wave flattening;; diffuse and unchanged;\par \par \par PLAN:\par \par 1).  Patient reassured her nuclear stress test did NOT demonstrate any signs for significant coronary ischemia and overall LV function was normal on echocardiogram.  There was some mild valvular insufficiency with no evidence for cardiac remodelling.\par \par 2).  Continue current cardiac meds the same.\par \par 3).  Follow up with PCP (Dr. Mireles) regarding routine checkups and fasting blood work including lipid panel.  Forward all testing/lab work to our office. \par \par 4).  Diet and lifestyle modification discussed including low sodium, low fat and low carbohydrate diet.  Patient is to implement aerobic exercise regimen few days per week. \par \par 5).  Recommend patient report any untoward symptoms. \par \par 6).  No additional cardiac testing indicated at this time. \par \par 7).  Follow up with Dr. Schaefer in 6 months or PRN.

## 2023-01-21 ENCOUNTER — OFFICE (OUTPATIENT)
Dept: URBAN - METROPOLITAN AREA CLINIC 6 | Facility: CLINIC | Age: 78
Setting detail: OPHTHALMOLOGY
End: 2023-01-21
Payer: MEDICAID

## 2023-01-21 DIAGNOSIS — G43.109: ICD-10-CM

## 2023-01-21 DIAGNOSIS — H01.001: ICD-10-CM

## 2023-01-21 DIAGNOSIS — H40.1112: ICD-10-CM

## 2023-01-21 DIAGNOSIS — H40.032: ICD-10-CM

## 2023-01-21 PROCEDURE — 92012 INTRM OPH EXAM EST PATIENT: CPT | Performed by: OPHTHALMOLOGY

## 2023-01-21 ASSESSMENT — REFRACTION_MANIFEST
OD_AXIS: 115
OS_SPHERE: +1.25
OS_AXIS: 084
OS_SPHERE: BALANCE
OD_CYLINDER: -1.25
OD_ADD: +3.00
OD_CYLINDER: -1.25
OS_ADD: +3.00
OD_SPHERE: PLANO
OD_AXIS: 115
OD_VA1: 20/70
OD_SPHERE: PLANO
OS_CYLINDER: -5.50
OD_VA1: 20/70

## 2023-01-21 ASSESSMENT — LID EXAM ASSESSMENTS
OD_BLEPHARITIS: RLL RUL 1+
OS_BLEPHARITIS: LLL LUL 1+

## 2023-01-21 ASSESSMENT — REFRACTION_CURRENTRX
OD_SPHERE: -0.50
OS_VPRISM_DIRECTION: SV
OD_AXIS: 96
OD_VPRISM_DIRECTION: SV
OD_AXIS: 094
OS_OVR_VA: 20/
OS_CYLINDER: SPHERE
OS_VPRISM_DIRECTION: SV
OS_SPHERE: PLANO
OD_CYLINDER: -1.00
OD_CYLINDER: -0.50
OS_CYLINDER: 0.00
OD_OVR_VA: 20/
OS_OVR_VA: 20/
OD_OVR_VA: 20/
OS_SPHERE: PLANO
OD_SPHERE: PLANO
OS_AXIS: 000
OD_VPRISM_DIRECTION: SV
OS_AXIS: 0

## 2023-01-21 ASSESSMENT — SPHEQUIV_DERIVED
OS_SPHEQUIV: -1
OD_SPHEQUIV: -0.5
OS_SPHEQUIV: -1.5

## 2023-01-21 ASSESSMENT — KERATOMETRY
OD_K1POWER_DIOPTERS: 40.50
OS_AXISANGLE_DEGREES: 163
OD_K2POWER_DIOPTERS: 41.75
OD_AXISANGLE_DEGREES: 022
OS_K2POWER_DIOPTERS: 43.50
OS_K1POWER_DIOPTERS: 39.00

## 2023-01-21 ASSESSMENT — REFRACTION_AUTOREFRACTION
OS_CYLINDER: -6.00
OS_AXIS: 079
OD_AXIS: 106
OD_CYLINDER: -1.50
OS_SPHERE: +2.00
OD_SPHERE: +0.25

## 2023-01-21 ASSESSMENT — AXIALLENGTH_DERIVED
OS_AL: 24.874
OS_AL: 25.0925
OD_AL: 24.7096

## 2023-01-21 ASSESSMENT — TONOMETRY
OD_IOP_MMHG: 12
OS_IOP_MMHG: 14

## 2023-01-21 ASSESSMENT — VISUAL ACUITY
OD_BCVA: 20/NLP
OS_BCVA: 20/50-

## 2023-01-31 ENCOUNTER — OFFICE (OUTPATIENT)
Dept: URBAN - METROPOLITAN AREA CLINIC 6 | Facility: CLINIC | Age: 78
Setting detail: OPHTHALMOLOGY
End: 2023-01-31
Payer: MEDICAID

## 2023-01-31 DIAGNOSIS — H01.004: ICD-10-CM

## 2023-01-31 DIAGNOSIS — H01.002: ICD-10-CM

## 2023-01-31 DIAGNOSIS — H01.005: ICD-10-CM

## 2023-01-31 DIAGNOSIS — H01.001: ICD-10-CM

## 2023-01-31 PROCEDURE — 99214 OFFICE O/P EST MOD 30 MIN: CPT | Performed by: OPHTHALMOLOGY

## 2023-01-31 ASSESSMENT — REFRACTION_MANIFEST
OD_AXIS: 115
OS_AXIS: 084
OD_SPHERE: PLANO
OS_ADD: +3.00
OS_CYLINDER: -5.50
OD_CYLINDER: -1.25
OD_SPHERE: PLANO
OD_VA1: 20/70
OS_SPHERE: +1.25
OS_SPHERE: BALANCE
OD_CYLINDER: -1.25
OD_ADD: +3.00
OD_VA1: 20/70
OD_AXIS: 115

## 2023-01-31 ASSESSMENT — REFRACTION_AUTOREFRACTION
OD_AXIS: 106
OS_AXIS: 80
OS_CYLINDER: -5.25
OD_SPHERE: PLANO
OS_SPHERE: +1.00
OD_CYLINDER: -1.25

## 2023-01-31 ASSESSMENT — SPHEQUIV_DERIVED
OS_SPHEQUIV: -1.5
OS_SPHEQUIV: -1.625

## 2023-01-31 ASSESSMENT — REFRACTION_CURRENTRX
OS_VPRISM_DIRECTION: SV
OD_VPRISM_DIRECTION: SV
OD_OVR_VA: 20/
OD_AXIS: 96
OD_AXIS: 094
OD_CYLINDER: -1.00
OD_SPHERE: -0.50
OS_VPRISM_DIRECTION: SV
OD_OVR_VA: 20/
OD_VPRISM_DIRECTION: SV
OS_SPHERE: PLANO
OS_OVR_VA: 20/
OS_SPHERE: PLANO
OS_OVR_VA: 20/
OS_AXIS: 0
OS_CYLINDER: SPHERE
OS_AXIS: 000
OD_SPHERE: PLANO
OD_CYLINDER: -0.50
OS_CYLINDER: 0.00

## 2023-01-31 ASSESSMENT — LID EXAM ASSESSMENTS
OD_BLEPHARITIS: RLL RUL 1+
OS_BLEPHARITIS: LLL LUL 1+

## 2023-01-31 ASSESSMENT — TONOMETRY
OD_IOP_MMHG: 10
OS_IOP_MMHG: 15

## 2023-01-31 ASSESSMENT — CONFRONTATIONAL VISUAL FIELD TEST (CVF)
OD_FINDINGS: FULL
OS_FINDINGS: FULL

## 2023-02-22 RX ORDER — KIT FOR THE PREPARATION OF TECHNETIUM TC99M SESTAMIBI 1 MG/5ML
INJECTION, POWDER, LYOPHILIZED, FOR SOLUTION PARENTERAL
Refills: 0 | Status: COMPLETED | OUTPATIENT
Start: 2023-02-22

## 2023-02-22 RX ADMIN — KIT FOR THE PREPARATION OF TECHNETIUM TC99M SESTAMIBI 0: 1 INJECTION, POWDER, LYOPHILIZED, FOR SOLUTION PARENTERAL at 00:00

## 2023-06-13 ENCOUNTER — NON-APPOINTMENT (OUTPATIENT)
Age: 78
End: 2023-06-13

## 2023-06-17 ENCOUNTER — OFFICE (OUTPATIENT)
Dept: URBAN - METROPOLITAN AREA CLINIC 6 | Facility: CLINIC | Age: 78
Setting detail: OPHTHALMOLOGY
End: 2023-06-17
Payer: MEDICAID

## 2023-06-17 VITALS — HEIGHT: 55 IN

## 2023-06-17 DIAGNOSIS — H35.3133: ICD-10-CM

## 2023-06-17 DIAGNOSIS — G43.109: ICD-10-CM

## 2023-06-17 DIAGNOSIS — H40.1112: ICD-10-CM

## 2023-06-17 DIAGNOSIS — H31.002: ICD-10-CM

## 2023-06-17 DIAGNOSIS — H01.004: ICD-10-CM

## 2023-06-17 DIAGNOSIS — H01.001: ICD-10-CM

## 2023-06-17 DIAGNOSIS — H40.032: ICD-10-CM

## 2023-06-17 DIAGNOSIS — Z96.1: ICD-10-CM

## 2023-06-17 PROCEDURE — 92250 FUNDUS PHOTOGRAPHY W/I&R: CPT | Performed by: OPHTHALMOLOGY

## 2023-06-17 PROCEDURE — 92014 COMPRE OPH EXAM EST PT 1/>: CPT | Performed by: OPHTHALMOLOGY

## 2023-06-17 ASSESSMENT — AXIALLENGTH_DERIVED
OS_AL: 24.9345
OD_AL: 34.8631
OS_AL: 24.9345
OD_AL: 35.08

## 2023-06-17 ASSESSMENT — KERATOMETRY
OS_K2POWER_DIOPTERS: 43.50
METHOD_AUTO_MANUAL: AUTO
OS_AXISANGLE_DEGREES: 174
OD_K1POWER_DIOPTERS: 4.50
OD_K2POWER_DIOPTERS: 41.50
OS_K1POWER_DIOPTERS: 39.50
OD_AXISANGLE_DEGREES: 030

## 2023-06-17 ASSESSMENT — SPHEQUIV_DERIVED
OD_SPHEQUIV: -0.5
OD_SPHEQUIV: -0.25
OS_SPHEQUIV: -1.375
OS_SPHEQUIV: -1.375

## 2023-06-17 ASSESSMENT — REFRACTION_MANIFEST
OS_SPHERE: BALANCE
OD_CYLINDER: -1.25
OS_CYLINDER: -6.75
OD_VA1: 20/70
OD_SPHERE: PLANO
OS_SPHERE: +2.00
OD_ADD: +3.00
OD_CYLINDER: -1.50
OS_ADD: +3.00
OS_AXIS: 080
OD_AXIS: 115
OD_SPHERE: +0.25
OD_AXIS: 105
OD_VA1: 20/60

## 2023-06-17 ASSESSMENT — REFRACTION_CURRENTRX
OS_AXIS: 0
OD_AXIS: 094
OD_CYLINDER: -1.00
OD_CYLINDER: -0.50
OS_VPRISM_DIRECTION: SV
OS_CYLINDER: 0.00
OS_AXIS: 000
OD_OVR_VA: 20/
OS_SPHERE: PLANO
OS_SPHERE: PLANO
OS_VPRISM_DIRECTION: SV
OD_AXIS: 096
OS_OVR_VA: 20/
OD_CYLINDER: -1.00
OD_VPRISM_DIRECTION: SV
OS_OVR_VA: 20/
OS_OVR_VA: 20/
OD_AXIS: 96
OD_OVR_VA: 20/
OD_OVR_VA: 20/
OD_SPHERE: -0.50
OD_SPHERE: PLANO
OS_CYLINDER: 0.00
OS_SPHERE: PLANO
OS_CYLINDER: SPHERE
OD_SPHERE: PLANO
OS_AXIS: 0
OD_VPRISM_DIRECTION: SV

## 2023-06-17 ASSESSMENT — REFRACTION_AUTOREFRACTION
OS_SPHERE: +2.00
OD_AXIS: 106
OS_AXIS: 082
OD_SPHERE: +0.25
OD_CYLINDER: -1.00
OS_CYLINDER: -6.75

## 2023-06-17 ASSESSMENT — LID EXAM ASSESSMENTS
OS_BLEPHARITIS: LLL LUL 1+
OD_BLEPHARITIS: RLL RUL 1+

## 2023-06-17 ASSESSMENT — TONOMETRY
OS_IOP_MMHG: 14
OD_IOP_MMHG: 15

## 2023-06-17 ASSESSMENT — CONFRONTATIONAL VISUAL FIELD TEST (CVF)
OD_FINDINGS: FULL
OS_FINDINGS: FULL

## 2023-06-17 ASSESSMENT — VISUAL ACUITY
OS_BCVA: 20/70+2
OD_BCVA: NLP

## 2023-06-29 ENCOUNTER — APPOINTMENT (OUTPATIENT)
Dept: CARDIOLOGY | Facility: CLINIC | Age: 78
End: 2023-06-29
Payer: MEDICAID

## 2023-06-29 ENCOUNTER — NON-APPOINTMENT (OUTPATIENT)
Age: 78
End: 2023-06-29

## 2023-06-29 VITALS
WEIGHT: 110 LBS | DIASTOLIC BLOOD PRESSURE: 62 MMHG | SYSTOLIC BLOOD PRESSURE: 104 MMHG | HEIGHT: 64 IN | HEART RATE: 72 BPM | BODY MASS INDEX: 18.78 KG/M2 | RESPIRATION RATE: 16 BRPM

## 2023-06-29 PROCEDURE — 99214 OFFICE O/P EST MOD 30 MIN: CPT | Mod: 25

## 2023-06-29 PROCEDURE — 93000 ELECTROCARDIOGRAM COMPLETE: CPT

## 2023-06-29 RX ORDER — ALPRAZOLAM 0.25 MG/1
0.25 TABLET ORAL
Qty: 3 | Refills: 0 | Status: DISCONTINUED | COMMUNITY
Start: 2023-05-26 | End: 2023-06-29

## 2023-06-29 NOTE — REASON FOR VISIT
[FreeTextEntry1] : ALEJANDRO PIERCE is being seen for a clinic follow-up of. \par \par The patient is a 77-year-old  woman with a history for  coronary stent to the circumflex vessel (2/22/21 ) after complaining of chest pain and shortness of breath with abnormal nuclear stress test; patient did well however she was lost to follow-up over the past year and a half as she was living in [place]; but now following up to our office over the past 6 months;\par \par She returns to the office today accompanied with her daughter to interpret;\par \par Last fall she had a nuclear stress test in November 2022 which showed improvement in myocardial perfusion, compared to her previous test prior to stenting-- with normal perfusion and no evidence of any ischemia.\par \par Overall her chest symptoms have improved.  She does describe some occasional epigastric discomfort which is somewhat vague and she has had for quite some time;\par Otherwise, no shortness of breath, PND or palpitations or dizziness;

## 2023-06-29 NOTE — ASSESSMENT
[FreeTextEntry1] : : The EKG illustrates sinus rhythm, rate of 72  bpm, nonspecific ST -T wave flattening;; diffuse and unchanged;\par \par In summary, this 77-year-old Chinese woman who was being monitored for history of CAD and prior coronary stents from 2021 has had a stable cardiac pattern and last nuclear stress test in late November 2022 was negative for ischemia.  She is accompanied with her daughter and her  today with her daughter translating;\par Otherwise, appears to be tolerating her current medical regimen;\par \par \par \par \par PLAN:\par \par 1).  Patient reassured; \par \par 2).  Continue current cardiac meds the same.\par \par 3).  Follow up with PCP (Dr. Mireles) regarding routine checkups and fasting blood work including lipid panel.  Forward all testing/lab work to our office. \par \par 4).  Diet and lifestyle modification discussed including low sodium, low fat and low carbohydrate diet.  Patient is to implement aerobic exercise regimen few days per week. \par \par 5).  Recommend patient report any untoward symptoms. \par \par 6).  No additional cardiac testing indicated at this time. \par \par 7).  Follow up within 5 to 6 months or as needed;

## 2023-06-29 NOTE — HISTORY OF PRESENT ILLNESS
[FreeTextEntry1] : \par Otherwise, she has been tolerating current medical regimen without difficulty including Plavix, ASA and Crestor; \par \par Cardiac catheterization (2/22/2021): Distal circumflex with 80% stenosis, OM3 branch with 70% stenosis. POBA performed to distal circumflex and stent to OM3. Normal function by TTE with LVEF of 55%;\par \par \par Pharmacologic Nuclear Stress Test (11/30/2022):  The patient developed chest tightness with Regadenoson which resolved with Aminophylline.  Normal Sestamibi myocardial perfusion imaging with artifact.  LV function was hyperdynamic with LVEF of 73%.  No longer evidence of ischemia when compared to prior study dated (2021);\par \par Transthoracic Echocardiogram (10/26/2022):  Normal LV systolic and diastolic function with LVEF of 65 to 70%. The left and right atria normal in size and structure.  Mild AR. Trace MR. Mild AK and TR. Mildly elevated pulmonary artery systolic pressure with no evidence of pericardial effusion;

## 2023-07-06 ENCOUNTER — APPOINTMENT (OUTPATIENT)
Dept: BREAST CENTER | Facility: CLINIC | Age: 78
End: 2023-07-06
Payer: MEDICAID

## 2023-07-06 VITALS
HEART RATE: 71 BPM | BODY MASS INDEX: 18.78 KG/M2 | DIASTOLIC BLOOD PRESSURE: 64 MMHG | SYSTOLIC BLOOD PRESSURE: 113 MMHG | WEIGHT: 110 LBS | HEIGHT: 64 IN

## 2023-07-06 DIAGNOSIS — D24.2 BENIGN NEOPLASM OF LEFT BREAST: ICD-10-CM

## 2023-07-06 DIAGNOSIS — N64.89 OTHER SPECIFIED DISORDERS OF BREAST: ICD-10-CM

## 2023-07-06 PROCEDURE — 99214 OFFICE O/P EST MOD 30 MIN: CPT

## 2023-07-06 NOTE — PAST MEDICAL HISTORY
[Menarche Age ____] : age at menarche was [unfilled] [Menopause Age____] : age at menopause was [unfilled] [Total Preg ___] : G[unfilled] [Live Births ___] : P[unfilled]  [Age At Live Birth ___] : Age at live birth: [unfilled] [History of Hormone Replacement Treatment] : has no history of hormone replacement treatment [FreeTextEntry7] : No [FreeTextEntry8] : 8 mo

## 2023-07-06 NOTE — CONSULT LETTER
[Dear  ___] : Dear  [unfilled], [Courtesy Letter:] : I had the pleasure of seeing your patient, [unfilled], in my office today. [Please see my note below.] : Please see my note below. [Consult Closing:] : Thank you very much for allowing me to participate in the care of this patient.  If you have any questions, please do not hesitate to contact me. [Sincerely,] : Sincerely, [FreeTextEntry3] : Shelia Keane MD FACS

## 2023-07-06 NOTE — HISTORY OF PRESENT ILLNESS
[FreeTextEntry1] : Ms. Valle is a 77 year old woman who presents for a follow-up for a left breast complex papillary lesion. She prefers for her daughter to translate. She is asymptomatic. No palpable breast or axillary lumps, nipple discharge, or skin changes. \par \par Her family history is not significant for any breast cancer. Her daughter Mily Underwood is a patient of Dr. Mckenna.

## 2023-07-06 NOTE — DATA REVIEWED
[FreeTextEntry1] : I have independently reviewed the reports and the images. \par \par B/l mammogram 5/26/23\par - heterogenously dense\par - L breast calcifications are stable; 6 mo L mammogram\par - BIRADS 3\par \par B/l US 5/26/23\par - 0.9 x 0.4 x 0.9 cm nodule in L breast 6:00 N1 [from 0.7 cm]; US bx\par - BIRADS 4\par \par US bx 6/8/23\par - L breast 6:00 N1, straight clip = complex papillary lesion; concordant

## 2023-12-05 ENCOUNTER — NON-APPOINTMENT (OUTPATIENT)
Age: 78
End: 2023-12-05

## 2023-12-05 ENCOUNTER — APPOINTMENT (OUTPATIENT)
Dept: CARDIOLOGY | Facility: CLINIC | Age: 78
End: 2023-12-05
Payer: MEDICAID

## 2023-12-05 VITALS
BODY MASS INDEX: 18.95 KG/M2 | HEART RATE: 70 BPM | HEIGHT: 64 IN | SYSTOLIC BLOOD PRESSURE: 112 MMHG | WEIGHT: 111 LBS | DIASTOLIC BLOOD PRESSURE: 80 MMHG | RESPIRATION RATE: 16 BRPM

## 2023-12-05 DIAGNOSIS — I49.1 ATRIAL PREMATURE DEPOLARIZATION: ICD-10-CM

## 2023-12-05 PROCEDURE — 93000 ELECTROCARDIOGRAM COMPLETE: CPT

## 2023-12-05 PROCEDURE — 99214 OFFICE O/P EST MOD 30 MIN: CPT | Mod: 25

## 2023-12-26 ENCOUNTER — OFFICE (OUTPATIENT)
Dept: URBAN - METROPOLITAN AREA CLINIC 6 | Facility: CLINIC | Age: 78
Setting detail: OPHTHALMOLOGY
End: 2023-12-26
Payer: MEDICAID

## 2023-12-26 VITALS — HEIGHT: 55 IN

## 2023-12-26 DIAGNOSIS — H40.1112: ICD-10-CM

## 2023-12-26 DIAGNOSIS — H01.004: ICD-10-CM

## 2023-12-26 DIAGNOSIS — G43.109: ICD-10-CM

## 2023-12-26 DIAGNOSIS — H01.001: ICD-10-CM

## 2023-12-26 PROCEDURE — 92083 EXTENDED VISUAL FIELD XM: CPT | Performed by: OPHTHALMOLOGY

## 2023-12-26 PROCEDURE — 99214 OFFICE O/P EST MOD 30 MIN: CPT | Performed by: OPHTHALMOLOGY

## 2023-12-26 ASSESSMENT — REFRACTION_CURRENTRX
OD_OVR_VA: 20/
OS_SPHERE: PLANO
OS_OVR_VA: 20/
OS_VPRISM_DIRECTION: SV
OD_VPRISM_DIRECTION: SV
OD_OVR_VA: 20/
OD_CYLINDER: -0.50
OS_AXIS: 0
OD_SPHERE: -0.50
OS_OVR_VA: 20/
OS_CYLINDER: SPHERE
OD_VPRISM_DIRECTION: SV
OD_CYLINDER: -1.00
OD_AXIS: 094
OS_VPRISM_DIRECTION: SV
OS_OVR_VA: 20/
OS_SPHERE: BALANCE
OD_AXIS: 96
OS_AXIS: 000
OD_CYLINDER: -1.00
OD_SPHERE: PLANO
OS_SPHERE: PLANO
OD_OVR_VA: 20/
OD_AXIS: 096
OD_SPHERE: PLANO
OS_CYLINDER: 0.00

## 2023-12-26 ASSESSMENT — REFRACTION_AUTOREFRACTION
OD_SPHERE: PLANO
OS_SPHERE: -0.25
OS_AXIS: 087
OD_CYLINDER: -1.00
OS_CYLINDER: -4.50
OD_AXIS: 110

## 2023-12-26 ASSESSMENT — REFRACTION_MANIFEST
OD_VA1: 20/60
OS_AXIS: 080
OD_SPHERE: PLANO
OD_SPHERE: +0.25
OD_CYLINDER: -1.25
OD_AXIS: 115
OD_AXIS: 105
OD_VA1: 20/70
OS_SPHERE: BALANCE
OD_ADD: +3.00
OD_CYLINDER: -1.50
OS_CYLINDER: -6.75
OS_ADD: +3.00
OS_SPHERE: +2.00

## 2023-12-26 ASSESSMENT — CONFRONTATIONAL VISUAL FIELD TEST (CVF)
OD_FINDINGS: FULL
OS_FINDINGS: FULL

## 2023-12-26 ASSESSMENT — SPHEQUIV_DERIVED
OD_SPHEQUIV: -0.5
OS_SPHEQUIV: -2.5
OS_SPHEQUIV: -1.375

## 2023-12-26 ASSESSMENT — LID EXAM ASSESSMENTS
OS_BLEPHARITIS: LLL LUL 1+
OD_BLEPHARITIS: RLL RUL 1+

## 2024-01-16 NOTE — H&P PST ADULT - NS NEC GEN PE MLT EXAM PC
If you are a smoker, it is important for your health to stop smoking. Please be aware that second hand smoke is also harmful.
No bruits; no thyromegaly or nodules

## 2024-04-09 ENCOUNTER — OFFICE (OUTPATIENT)
Dept: URBAN - METROPOLITAN AREA CLINIC 6 | Facility: CLINIC | Age: 79
Setting detail: OPHTHALMOLOGY
End: 2024-04-09
Payer: MEDICAID

## 2024-04-09 VITALS — HEIGHT: 55 IN

## 2024-04-09 DIAGNOSIS — H40.1112: ICD-10-CM

## 2024-04-09 PROCEDURE — 99213 OFFICE O/P EST LOW 20 MIN: CPT | Performed by: OPHTHALMOLOGY

## 2024-04-09 ASSESSMENT — LID EXAM ASSESSMENTS
OD_BLEPHARITIS: RLL RUL 1+
OS_BLEPHARITIS: LLL LUL 1+

## 2024-04-11 ENCOUNTER — NON-APPOINTMENT (OUTPATIENT)
Age: 79
End: 2024-04-11

## 2024-04-11 ENCOUNTER — APPOINTMENT (OUTPATIENT)
Dept: CARDIOLOGY | Facility: CLINIC | Age: 79
End: 2024-04-11
Payer: MEDICAID

## 2024-04-11 VITALS
BODY MASS INDEX: 19.29 KG/M2 | RESPIRATION RATE: 16 BRPM | WEIGHT: 113 LBS | DIASTOLIC BLOOD PRESSURE: 80 MMHG | HEART RATE: 67 BPM | HEIGHT: 64 IN | SYSTOLIC BLOOD PRESSURE: 102 MMHG

## 2024-04-11 DIAGNOSIS — Z95.5 PRESENCE OF CORONARY ANGIOPLASTY IMPLANT AND GRAFT: ICD-10-CM

## 2024-04-11 DIAGNOSIS — R00.2 PALPITATIONS: ICD-10-CM

## 2024-04-11 DIAGNOSIS — R94.31 ABNORMAL ELECTROCARDIOGRAM [ECG] [EKG]: ICD-10-CM

## 2024-04-11 DIAGNOSIS — R07.9 CHEST PAIN, UNSPECIFIED: ICD-10-CM

## 2024-04-11 DIAGNOSIS — I25.10 ATHEROSCLEROTIC HEART DISEASE OF NATIVE CORONARY ARTERY W/OUT ANGINA PECTORIS: ICD-10-CM

## 2024-04-11 DIAGNOSIS — E78.00 PURE HYPERCHOLESTEROLEMIA, UNSPECIFIED: ICD-10-CM

## 2024-04-11 PROCEDURE — 93000 ELECTROCARDIOGRAM COMPLETE: CPT

## 2024-04-11 PROCEDURE — 99214 OFFICE O/P EST MOD 30 MIN: CPT

## 2024-04-11 PROCEDURE — G2211 COMPLEX E/M VISIT ADD ON: CPT | Mod: NC,1L

## 2024-04-11 RX ORDER — ATORVASTATIN CALCIUM 20 MG/1
20 TABLET, FILM COATED ORAL
Qty: 90 | Refills: 3 | Status: DISCONTINUED | COMMUNITY
End: 2024-04-11

## 2024-04-11 RX ORDER — CARBIDOPA AND LEVODOPA 25; 100 MG/1; MG/1
25-100 TABLET ORAL
Refills: 0 | Status: ACTIVE | COMMUNITY

## 2024-04-11 RX ORDER — PRAVASTATIN SODIUM 20 MG/1
20 TABLET ORAL
Refills: 0 | Status: ACTIVE | COMMUNITY

## 2024-04-11 NOTE — ASSESSMENT
[FreeTextEntry1] : EKG demonstrating normal sinus rhythm at a rate of 67.  There is some borderline nonspecific T wave flattening changes essentially unchanged from previous;  In summary, the patient is a 78-year-old  woman with a history for coronary disease and prior coronary stent who has had a stable cardiac pattern on her cholesterol medication.   She has additional health issues with recently being diagnosed as having early or mild Parkinson's disease and also she has been "legally blind" and needs help to ambulate.  Recently she did sustain a fall at home most likely mechanical but no significant injury;  Otherwise, she appears hemodynamically and cardiac stable;  Plan:   Continue current cardiac regimen with cholesterol lowering medication;  No additional cardiac workup indicated at this time;  Follow-up to office within 5 to 6 months or as needed;  Regular checkups and laboratory blood test with primary care encouraged;  Precautions on falling etc. and discussed possibility of considering some physical therapy if possible

## 2024-04-11 NOTE — REASON FOR VISIT
[FreeTextEntry1] : ALEJANDRO PIERCE is being seen for a clinic follow-up of.   The patient is a 78year-old  woman with a history for  coronary stent to the circumflex vessel (2/22/21 ) after complaining of chest pain and shortness of breath with abnormal nuclear stress test; patient did well however she was lost to follow-up over the past year and a half as she was living in [place]; but now following up to our office over the past year;  She returns to the office today accompanied with her daughter and  who is also a patient (Mr. Alvarez) to interpret;;  Last  nuclear stress test in November 2022 which showed improvement in myocardial perfusion, compared to her previous test prior to stenting-- with normal perfusion and no evidence of any ischemia.  The patient unfortunately did fall in the house not too long ago but did not sustain any fractures and did not get seen and x-rayed.  It seems that she is "legally blind" and having difficulty ambulating and they have to take her by hands to have her do some walking.  In addition to this she apparently may have "early parkinsonism" and was recently put on antiparkinson medication;  She denies any significant chest pain, shortness of breath, palpitations or syncope;

## 2024-04-11 NOTE — HISTORY OF PRESENT ILLNESS
[FreeTextEntry1] : \par  Otherwise, she has been tolerating current medical regimen without difficulty including Plavix, ASA and Crestor; \par  \par  Cardiac catheterization (2/22/2021): Distal circumflex with 80% stenosis, OM3 branch with 70% stenosis. POBA performed to distal circumflex and stent to OM3. Normal function by TTE with LVEF of 55%;\par  \par  \par  Pharmacologic Nuclear Stress Test (11/30/2022):  The patient developed chest tightness with Regadenoson which resolved with Aminophylline.  Normal Sestamibi myocardial perfusion imaging with artifact.  LV function was hyperdynamic with LVEF of 73%.  No longer evidence of ischemia when compared to prior study dated (2021);\par  \par  Transthoracic Echocardiogram (10/26/2022):  Normal LV systolic and diastolic function with LVEF of 65 to 70%. The left and right atria normal in size and structure.  Mild AR. Trace MR. Mild IL and TR. Mildly elevated pulmonary artery systolic pressure with no evidence of pericardial effusion;

## 2024-04-11 NOTE — PHYSICAL EXAM
[Well Developed] : well developed [Well Nourished] : well nourished [No Acute Distress] : no acute distress [Normal Conjunctiva] : normal conjunctiva [Normal Venous Pressure] : normal venous pressure [No Carotid Bruit] : no carotid bruit [Normal S1, S2] : normal S1, S2 [No Murmur] : no murmur [No Rub] : no rub [No Gallop] : no gallop [Clear Lung Fields] : clear lung fields [Good Air Entry] : good air entry [No Respiratory Distress] : no respiratory distress  [Soft] : abdomen soft [Non Tender] : non-tender [No Masses/organomegaly] : no masses/organomegaly [Normal Gait] : normal gait [No Edema] : no edema [No Cyanosis] : no cyanosis [No Clubbing] : no clubbing [No Rash] : no rash [No Skin Lesions] : no skin lesions [Moves all extremities] : moves all extremities [No Focal Deficits] : no focal deficits [Normal Speech] : normal speech [Alert and Oriented] : alert and oriented [Normal memory] : normal memory no

## 2024-06-13 NOTE — ED ADULT TRIAGE NOTE - BSA (M2)
Patient presents for palliative diabetic footcare.  No acute disorder noted.  Pedal pulses are palpable.  All toenails are elongated and thick secondary to onychomycosis.  Treatment consisted of trimming of multiple mycotic toenails.  
1.6

## 2024-07-26 ENCOUNTER — APPOINTMENT (OUTPATIENT)
Dept: OBGYN | Facility: CLINIC | Age: 79
End: 2024-07-26
Payer: MEDICAID

## 2024-07-26 VITALS
HEIGHT: 63 IN | SYSTOLIC BLOOD PRESSURE: 122 MMHG | BODY MASS INDEX: 18.96 KG/M2 | WEIGHT: 107 LBS | DIASTOLIC BLOOD PRESSURE: 70 MMHG

## 2024-07-26 DIAGNOSIS — N89.8 OTHER SPECIFIED NONINFLAMMATORY DISORDERS OF VAGINA: ICD-10-CM

## 2024-07-26 DIAGNOSIS — Z12.4 ENCOUNTER FOR SCREENING FOR MALIGNANT NEOPLASM OF CERVIX: ICD-10-CM

## 2024-07-26 PROCEDURE — 99203 OFFICE O/P NEW LOW 30 MIN: CPT

## 2024-07-27 LAB
CANDIDA VAG CYTO: NOT DETECTED
G VAGINALIS+PREV SP MTYP VAG QL MICRO: DETECTED
T VAGINALIS VAG QL WET PREP: NOT DETECTED

## 2024-07-29 NOTE — HISTORY OF PRESENT ILLNESS
[FreeTextEntry1] : 79yo  postmenopausal female presents today for new gyn visit Accompanied with her daughter whom speaks for her as she does not speak English and declines  Pt with partial blindness  Complains of white discharge x years, states she noticed discharge after implementing Miralax due to constipation She denies any vaginal bleeding. No changes to bowel or bladder habits.  Reports negative urine culture with PCP last week.   Menstrual triad: 14 x 28 x 5 LMP 53yo, no PMB No use of HRT  Gyn: Last pap ~ 2 years ago, nl per pt. No hx of abnormal paps Follows with breast specialist due to cystic breasts  OB  x 2  PMHx Blindness Parkinson's  Cardiac angioplasty  HLD  Sx:  Cardiac angioplasty  Right hip replacement   SH: Retired,  Non smoker Lives with daughter    [Mammogramdate] : 2024 [PapSmeardate] : 2022 [ColonoscopyDate] : 2023

## 2024-07-29 NOTE — PHYSICAL EXAM
[Appropriately responsive] : appropriately responsive [Alert] : alert [No Acute Distress] : no acute distress [No Lymphadenopathy] : no lymphadenopathy [Soft] : soft [Non-tender] : non-tender [Oriented x3] : oriented x3 [Examination Of The Breasts] : a normal appearance [No Masses] : no breast masses were palpable [Vulvar Atrophy] : vulvar atrophy [Labia Majora] : normal [Labia Minora] : normal [Atrophy] : atrophy [Normal] : normal [Uterine Adnexae] : normal [Declined] : Patient declined rectal exam [FreeTextEntry4] : affirm taken  [FreeTextEntry5] : Pap taken

## 2024-07-29 NOTE — DISCUSSION/SUMMARY
[FreeTextEntry1] : Health Maintenance:   -Pap -TBSE -Achieve Vit D levels of 30-40, intake of 1100 mg daily calcium mostly thru dark green leafy greens and milk products, exercise 30 minutes TIW  Vaginal discharge -Follow up on affirm  -Discussed benefits of probitoics

## 2024-07-31 LAB — CYTOLOGY CVX/VAG DOC THIN PREP: ABNORMAL

## 2024-08-01 ENCOUNTER — APPOINTMENT (OUTPATIENT)
Dept: BREAST CENTER | Facility: CLINIC | Age: 79
End: 2024-08-01
Payer: MEDICAID

## 2024-08-01 VITALS
DIASTOLIC BLOOD PRESSURE: 76 MMHG | HEIGHT: 63 IN | BODY MASS INDEX: 18.96 KG/M2 | HEART RATE: 70 BPM | WEIGHT: 107 LBS | SYSTOLIC BLOOD PRESSURE: 121 MMHG

## 2024-08-01 DIAGNOSIS — D24.2 BENIGN NEOPLASM OF LEFT BREAST: ICD-10-CM

## 2024-08-01 DIAGNOSIS — G20.A1 PARKINSON'S DISEASE WITHOUT DYSKINESIA, WITHOUT MENTION OF FLUCTUATIONS: ICD-10-CM

## 2024-08-01 DIAGNOSIS — Z12.39 ENCOUNTER FOR OTHER SCREENING FOR MALIGNANT NEOPLASM OF BREAST: ICD-10-CM

## 2024-08-01 DIAGNOSIS — R92.1 MAMMOGRAPHIC CALCIFICATION FOUND ON DIAGNOSTIC IMAGING OF BREAST: ICD-10-CM

## 2024-08-01 DIAGNOSIS — R92.8 OTHER ABNORMAL AND INCONCLUSIVE FINDINGS ON DIAGNOSTIC IMAGING OF BREAST: ICD-10-CM

## 2024-08-01 DIAGNOSIS — N64.89 OTHER SPECIFIED DISORDERS OF BREAST: ICD-10-CM

## 2024-08-01 PROCEDURE — 99214 OFFICE O/P EST MOD 30 MIN: CPT

## 2024-08-01 NOTE — PHYSICAL EXAM
yes [Normocephalic] : normocephalic [Atraumatic] : atraumatic [Sclera nonicteric] : sclera nonicteric [Supple] : supple [No Supraclavicular Adenopathy] : no supraclavicular adenopathy [No Cervical Adenopathy] : no cervical adenopathy [Clear to Auscultation Bilat] : clear to auscultation bilaterally [Normal Sinus Rhythm] : normal sinus rhythm [Examined in the supine and seated position] : examined in the supine and seated position [Bra Size: ___] : Bra Size: [unfilled] [No dominant masses] : no dominant masses in right breast  [No dominant masses] : no dominant masses left breast [No Nipple Retraction] : no left nipple retraction [No Nipple Discharge] : no left nipple discharge [No Axillary Lymphadenopathy] : no left axillary lymphadenopathy [No Edema] : no edema [No Rashes] : no rashes [No Ulceration] : no ulceration

## 2024-08-01 NOTE — ASSESSMENT
[FreeTextEntry1] : Ms. Valle is a 77 year old woman with a left breast complex papillary lesion, left breast calcifications, and a new right complicated cyst on US. Her breast exam today is without suspicious findings. The left complex papillary lesion has been stable for a year, and can be observed. There are no suspicious calcifications seen on her mammogram. The newly identified right complicated cyst has a benign appearance; a 6 month R US is ordered for January. I would like to see her back for a follow-up in 1 year. She understands and is comfortable with the plan. She is encouraged to call if any questions or concerns arise.

## 2024-08-01 NOTE — DATA REVIEWED
[FreeTextEntry1] : I have independently reviewed the reports and the images.   B/l mammogram 5/26/23 - heterogenously dense - L breast calcifications are stable; 6 mo L mammogram - BIRADS 3  B/l US 5/26/23 - 0.9 x 0.4 x 0.9 cm nodule in L breast 6:00 N1 [from 0.7 cm]; US bx - BIRADS 4  US bx 6/8/23 - L breast 6:00 N1, straight clip = complex papillary lesion; concordant  B/l mammogram 7/22/24 - heterogenously dense - BIRADS 2   B/l US 7/22/24 - new sub-cm [minimally] complicated cyst in R breast 4:00; 6 mo R US - 0.9 cm nodule in L breast 6:00 N1, biopsied [stable] -  BIRADS 3

## 2024-08-01 NOTE — CONSULT LETTER
[Dear  ___] : Dear  [unfilled], [Courtesy Letter:] : I had the pleasure of seeing your patient, [unfilled], in my office today. [Please see my note below.] : Please see my note below. [Consult Closing:] : Thank you very much for allowing me to participate in the care of this patient.  If you have any questions, please do not hesitate to contact me. [Sincerely,] : Sincerely, [FreeTextEntry3] : Shelia Keane MD FACS  [DrJosé  ___] : Dr. AGGARWAL

## 2024-08-01 NOTE — HISTORY OF PRESENT ILLNESS
[FreeTextEntry1] : Ms. Valle is a 77 year old woman who presents for a follow-up for a left breast complex papillary lesion, left calcifications, and a right complicated cyst on US. She prefers for her daughter to translate. She has no breast symptoms. She has been diagnosed with Parkinson's and needs assistance when walking for stability.  Her family history is not significant for any breast cancer. Her daughter Mily Underwood is a patient of Dr. Mckenna.

## 2024-08-06 ENCOUNTER — OFFICE (OUTPATIENT)
Dept: URBAN - METROPOLITAN AREA CLINIC 6 | Facility: CLINIC | Age: 79
Setting detail: OPHTHALMOLOGY
End: 2024-08-06
Payer: MEDICAID

## 2024-08-06 DIAGNOSIS — H01.001: ICD-10-CM

## 2024-08-06 DIAGNOSIS — G43.109: ICD-10-CM

## 2024-08-06 DIAGNOSIS — H01.004: ICD-10-CM

## 2024-08-06 DIAGNOSIS — H01.005: ICD-10-CM

## 2024-08-06 DIAGNOSIS — H40.1112: ICD-10-CM

## 2024-08-06 DIAGNOSIS — H40.032: ICD-10-CM

## 2024-08-06 DIAGNOSIS — H01.002: ICD-10-CM

## 2024-08-06 PROCEDURE — 99214 OFFICE O/P EST MOD 30 MIN: CPT | Performed by: OPHTHALMOLOGY

## 2024-08-06 PROCEDURE — 92250 FUNDUS PHOTOGRAPHY W/I&R: CPT | Performed by: OPHTHALMOLOGY

## 2024-08-06 ASSESSMENT — LID EXAM ASSESSMENTS
OS_BLEPHARITIS: LLL LUL 2+
OD_BLEPHARITIS: RLL RUL 2+

## 2024-08-06 ASSESSMENT — CONFRONTATIONAL VISUAL FIELD TEST (CVF)
OD_FINDINGS: FULL
OS_FINDINGS: FULL

## 2024-09-17 ENCOUNTER — NON-APPOINTMENT (OUTPATIENT)
Age: 79
End: 2024-09-17

## 2024-09-17 ENCOUNTER — APPOINTMENT (OUTPATIENT)
Dept: CARDIOLOGY | Facility: CLINIC | Age: 79
End: 2024-09-17
Payer: MEDICAID

## 2024-09-17 VITALS
WEIGHT: 100 LBS | DIASTOLIC BLOOD PRESSURE: 60 MMHG | HEART RATE: 68 BPM | BODY MASS INDEX: 17.72 KG/M2 | RESPIRATION RATE: 16 BRPM | HEIGHT: 63 IN | SYSTOLIC BLOOD PRESSURE: 100 MMHG

## 2024-09-17 DIAGNOSIS — R00.2 PALPITATIONS: ICD-10-CM

## 2024-09-17 DIAGNOSIS — R09.89 OTHER SPECIFIED SYMPTOMS AND SIGNS INVOLVING THE CIRCULATORY AND RESPIRATORY SYSTEMS: ICD-10-CM

## 2024-09-17 DIAGNOSIS — I25.10 ATHEROSCLEROTIC HEART DISEASE OF NATIVE CORONARY ARTERY W/OUT ANGINA PECTORIS: ICD-10-CM

## 2024-09-17 PROCEDURE — 99214 OFFICE O/P EST MOD 30 MIN: CPT

## 2024-09-17 PROCEDURE — G2211 COMPLEX E/M VISIT ADD ON: CPT | Mod: NC

## 2024-09-17 PROCEDURE — 93000 ELECTROCARDIOGRAM COMPLETE: CPT

## 2024-09-17 NOTE — ASSESSMENT
[FreeTextEntry1] : EKG demonstrating normal sinus rhythm at a rate of 68.  There is some borderline nonspecific T wave flattening changes essentially unchanged from previous;  In summary, the patient is a 78-year-old  woman with a history for coronary disease and prior coronary stent who has had a stable cardiac pattern on her cholesterol medication.   She has additional health issues with recently being diagnosed as having early or mild Parkinson's disease and also she has been "legally blind" and needs help to ambulate.    Otherwise, she appears hemodynamically and cardiac stable;  Plan:   Continue current cardiac regimen with cholesterol lowering medication;  Recommend prior to next visit carotid duplex study to assess carotid plaquing stenosis and echocardiogram.  Discussed also recommendation for follow-up cardiac stress test but not urgent at this time and will discuss at next visit;  Follow-up to office within 5 to 6 months or as needed;  Regular checkups and laboratory blood test with primary care encouraged;  Have recommended trying to get a little more physical exercise and activity for this patient as tolerated;

## 2024-09-17 NOTE — REASON FOR VISIT
[FreeTextEntry1] : ALEJANDRO PIERCE is being seen for a clinic follow-up of.   The patient is a 78 year-old  woman with a history for  coronary stent to the circumflex vessel (2/22/21 ) after complaining of chest pain and shortness of breath with abnormal nuclear stress test; patient did well however she was lost to follow-up over the past year and a half as she was living in [place]; but now following up to our office over the past year;  She returns to the office today accompanied with her daughter and  who is also a patient (Mr. Alvarez) to interpret;;  Last  nuclear stress test in November 2022 which showed improvement in myocardial perfusion, compared to her previous test prior to stenting-- with normal perfusion and no evidence of any ischemia.  The patient unfortunately did fall in the house not too long ago but did not sustain any fractures and did not get seen and x-rayed.  It seems that she is "legally blind" and having difficulty ambulating and they have to take her by hands to have her do some walking.  In addition to this she apparently may have "early parkinsonism" and was recently put on antiparkinson medication;  She denies any significant chest pain, shortness of breath, palpitations or syncope;

## 2024-09-17 NOTE — PHYSICAL EXAM
[Well Developed] : well developed [Well Nourished] : well nourished [No Acute Distress] : no acute distress [Normal Conjunctiva] : normal conjunctiva [Normal Venous Pressure] : normal venous pressure [No Carotid Bruit] : no carotid bruit [Normal S1, S2] : normal S1, S2 [No Murmur] : no murmur [No Rub] : no rub [No Gallop] : no gallop [Clear Lung Fields] : clear lung fields [Good Air Entry] : good air entry [No Respiratory Distress] : no respiratory distress  [Soft] : abdomen soft [Non Tender] : non-tender [No Masses/organomegaly] : no masses/organomegaly [Normal Gait] : normal gait [No Edema] : no edema [No Cyanosis] : no cyanosis [No Clubbing] : no clubbing [No Rash] : no rash [No Skin Lesions] : no skin lesions [Moves all extremities] : moves all extremities [No Focal Deficits] : no focal deficits [Normal Speech] : normal speech [Alert and Oriented] : alert and oriented [Normal memory] : normal memory [de-identified] : Thin  woman alert, in no acute distress

## 2024-09-17 NOTE — HISTORY OF PRESENT ILLNESS
[FreeTextEntry1] : Otherwise, she has been tolerating current medical regimen without difficulty including Plavix, ASA and Crestor;   Her daughter reports that she is "too sedentary" and does not do any formal exercising does not get out much;  Her appetite is only "fair" and she remains thin;   Cardiac catheterization (2/22/2021): Distal circumflex with 80% stenosis, OM3 branch with 70% stenosis. POBA performed to distal circumflex and stent to OM3. Normal function by TTE with LVEF of 55%;   Pharmacologic Nuclear Stress Test (11/30/2022):  The patient developed chest tightness with Regadenoson which resolved with Aminophylline.  Normal Sestamibi myocardial perfusion imaging with artifact.  LV function was hyperdynamic with LVEF of 73%.  No longer evidence of ischemia when compared to prior study dated (2021);  Transthoracic Echocardiogram (10/26/2022):  Normal LV systolic and diastolic function with LVEF of 65 to 70%. The left and right atria normal in size and structure.  Mild AR. Trace MR. Mild [place] and TR. Mildly elevated pulmonary artery systolic pressure with no evidence of pericardial effusion;

## 2025-02-06 ENCOUNTER — APPOINTMENT (OUTPATIENT)
Dept: CARDIOLOGY | Facility: CLINIC | Age: 80
End: 2025-02-06

## 2025-06-18 NOTE — H&P PST ADULT - LAST STRESS TEST
Medication passed protocol.     Medication: Zetia  passed protocol.   Last office visit date: 3/10/25  Next appointment scheduled?: Yes    SEE HPI